# Patient Record
Sex: MALE | Race: BLACK OR AFRICAN AMERICAN | URBAN - METROPOLITAN AREA
[De-identification: names, ages, dates, MRNs, and addresses within clinical notes are randomized per-mention and may not be internally consistent; named-entity substitution may affect disease eponyms.]

---

## 2022-03-18 PROBLEM — I63.81 LEFT THALAMIC INFARCTION (HCC): Status: ACTIVE | Noted: 2022-01-25

## 2022-03-19 PROBLEM — F17.200 SMOKING: Status: ACTIVE | Noted: 2022-01-25

## 2022-03-19 PROBLEM — E78.2 MIXED HYPERLIPIDEMIA: Status: ACTIVE | Noted: 2022-01-25

## 2023-08-16 ENCOUNTER — HOSPITAL ENCOUNTER (OUTPATIENT)
Dept: PHYSICAL THERAPY | Age: 63
Setting detail: RECURRING SERIES
Discharge: HOME | End: 2023-08-19
Payer: COMMERCIAL

## 2023-08-16 PROCEDURE — 97110 THERAPEUTIC EXERCISES: CPT

## 2023-08-16 PROCEDURE — 97165 OT EVAL LOW COMPLEX 30 MIN: CPT

## 2023-08-16 PROCEDURE — 97140 MANUAL THERAPY 1/> REGIONS: CPT

## 2023-08-16 PROCEDURE — 97162 PT EVAL MOD COMPLEX 30 MIN: CPT

## 2023-08-16 ASSESSMENT — PAIN SCALES - GENERAL
PAINLEVEL_OUTOF10: 0
PAINLEVEL_OUTOF10: 0

## 2023-08-16 NOTE — THERAPY EVALUATION
Sonal Piedad  : 1960  Primary: [employer]  Secondary: [address] @ [address] [facility],[address] [place] [address]  [address] [place] [facility] [address]  Phone: [phone]  Fax: [phone] Plan Frequency: 2x per week    Plan of Care/Certification Expiration Date: 23      PT Visit Info:  Plan Frequency: 2x per week  Plan of Care/Certification Expiration Date: 23  Total # of Visits to Date: 1  Progress Note Counter: 1      Visit Count: 1                OUTPATIENT PHYSICAL THERAPY:             OP NOTE TYPE: Initial Assessment 2023               Episode (PT - CVA) Appt Desk         Treatment Diagnoses:    Generalized Muscle Weakness (M62.81)  Difficulty in walking, Not elsewhere classified (R26.2)  Other abnormalities of gait and mobility (R26.89)  Hemiplegia and hemiparesis following cerebral infarction affecting right dominant side (I69.351)  Medical/Referring Diagnosis:   Weakness [R53.1]  Referring Physician: Chuck Correia, APRN - NP  MD Orders: PT Eval and Treat   Return MD Appt: TBD  Date of Onset:      Allergies: Patient has no allergy information on record. Restrictions/Precautions:        Medications Last Reviewed: 2023     SUBJECTIVE   History of Injury/Illness (Reason for Referral):  Mr. Camille Palencia presents to outpatient PT s/p CVA affecting his R side. He says he woke up on 23 and noticed staggering during gait and slurred speech. He said he was going into work when he was encouraged to go to the doctor. He says the stroke affected his R side primarily, but he also notices some LUE deficits with his shoulder and hand specifically. He says he has a hard time grasping and holding onto something with his L hand without dropping it. He says that his UE deficits are greater than his LE deficits. He states that this is his 2nd stroke and that he was able to recover from the first one in 3-4 weeks.  He denies any

## 2023-08-16 NOTE — PROGRESS NOTES
Millicent Ramírez  : 1960  Primary: [employer]  Secondary: [address] @ [place] [address] [place] [address]  Phone: [phone]  Fax: [phone]    >OT Visit Info:   Plan Frequency: 1-2 times/week  Certification Period Expiration Date: 23     Visit Count: Visit count could not be calculated. Make sure you are using a visit which is associated with an episode. OUTPATIENT OCCUPATIONAL THERAPY: Treatment Note 2023  Episode: (OT - CVA)   Appt Desk      Treatment Diagnosis:  Pain in Left Shoulder (M25.512)  Pain in Left Wrist (M25.532)  Other lack of cordination (R27.8)  Hemiplegia and hemiparesis following cerebral infarction affecting right dominant side (M95.141)  Medical/Referring Diagnosis:  Weakness [R53.1]  Referring Physician:  TATIANA Lopez NP, MD Orders:  OT Eval and Treat   Date of Onset:  Onset Date: 23     Allergies:  Patient has no allergy information on record. Restrictions/Precautions:    No data recordedNo data recorded  Medications Last Reviewed:  2023     Interventions Planned: (Treatment may consist of any combination of the following)  Current Treatment Recommendations: Strengthening; ROM; Neuromuscular re-education; Pain management; Safety education & training; Equipment evaluation, education, & procurement; Modalities; Positioning; Dry needling; Self-Care / ADL; Manual Therapy:  STM; Manual Therapy:  Karan Warner; Coordination training; Other (comment) (orthotic management and training)     >Subjective Comments:  Patient complains of pain in his L shoulder/hand. Reports a history of R rotator cuff problems with history of surgery. >Initial:     0/10 >Post Session:     0/10  Medications Last Reviewed:  2023  Updated Objective Findings:  See evaluation note from today  Treatment   Manual Therapy: (8 minutes):  Manipulation and joint stabilization

## 2023-08-16 NOTE — THERAPY EVALUATION
Brad Khan  : 1960  Primary: [employer]  Secondary: [address] @ [address]  [facility]  [place] [address]  Phone: [phone]  Fax: [phone]    OT Visit Info:  Plan Frequency: 1-2 times/week  Certification Period Expiration Date: 23     Visit Count: Visit count could not be calculated. Make sure you are using a visit which is associated with an episode. OUTPATIENT OCCUPATIONAL THERAPY:OP NOTE TYPE: Initial Assessment 2023  Episode: (OT - CVA)   Appt Desk        Treatment Diagnosis:  Pain in Left Shoulder (M25.512)  Pain in Left Wrist (M25.532)  Other lack of cordination (R27.8)  Hemiplegia and hemiparesis following cerebral infarction affecting right dominant side (Y70.219)  Medical/Referring Diagnosis:  Weakness [R53.1]  Referring Physician:  TATIANA Wang NP, MD Orders:  OT Eval and Treat   Return MD Appt:    Date of Onset:  Onset Date: 23     Allergies:  Patient has no allergy information on record. Restrictions/Precautions:    No data recordedNo data recorded  Medications Last Reviewed:  2023     SUBJECTIVE   History of Injury/Illness (Reason for Referral):  Patient sustained a L pontine infarct on the . This was his 2nd stroke (History of prior left thalamic infarct (2021)- 3 weeks to recover his last one - about a year ago. Woke up that morning to go to work and thought he was fine, noticed he was staggering, going to get dressed, speech was slurred. Affected R side but L hand to forearm hurts and can hold something in L hand (tries to) and he drops it. L shoulder and hand pain at the base of his thumb. He states he is taking blood pressure medicine, blood thinners and cholesterol medicine. 81 mg baby aspirin. Patient denies any visual issues. States he has to think about moving now.   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

## 2023-08-16 NOTE — PROGRESS NOTES
GAIT TRAINING: (0 minutes): Gait training to improve and/or restore physical functioning as related to mobility, strength, balance and coordination. Ambulated with minimal visual, verbal, and tactile cueing related to stance phase, stride length, push off and heel strike. THERAPEUTIC EXERCISE: (16 minutes): Exercises per grid below to improve mobility, strength, and balance. Required minimal visual, verbal and tactile cues to promote proper body alignment, posture and body mechanics. Progressed resistance, range and repetitions as indicated. THERAPEUTIC ACTIVITY: (0 minutes): Therapeutic activities per grid below to improve mobility, strength and balance. Required minimal visual, verbal and tactile cues to promote static and dynamic balance in sitting/standing, as well as coordination of bilateral extremities. NEUROMUSCULAR RE-EDUCATION: (0 minutes): Exercise/activities per grid below to improve balance, coordination, kinesthetic sense, posture and proprioception. Required minimal visual, verbal and tactile cues to promote static and dynamic balance in sitting/standing, as well as coordination of bilateral extremities. MANUAL THERAPY: (0 minutes): Joint mobilization, soft tissue mobilization and manipulation was utilized and necessary because of the patient's restricted joint motion, painful spasm, loss of articular motion and restricted motion of soft tissue.      MODALITIES: (0 minutes): Vasopneumatic compression at 34 degrees      Date: 8/16/23 Date:  Date:    Activity/Exercise      Patient assessment & education HEP review     Bridges   1 x 10     Clam shells   1 x 10 each side  Red TB at knees     Sit to stands   1 x 10 w/o UE support                               HEP   - Supine Bridge  - 1 x daily - 7 x weekly - 3 sets - 15 reps  - Clam Shells  - 1 x daily - 3 x weekly - 3 sets - 15 reps  - Sit to Stand with Arms Crossed  - 2 x daily - 7 x weekly - 2 sets - 10 reps    Treatment/Session

## 2023-08-18 NOTE — THERAPY EVALUATION
(Patient reports his right upper central incisor is loose and getting pulled soon with plans to have a partial.)  Oral Hygiene: Moist, Clean  Lingual: No impairment  Velum: No Impairment  Mandible: No impairment   Additional Observations: Slight asymmetry with cheek puff (less air on the left side); Slight decreased ROM with alternating protrusion and retraction    Assessment tool used: [place] Dysarthria Assessment Tool  Phonation:   Loudness: Mildly reduced volume average: 70 dB  Quality: Clear vocal quality; no vocal strain perceived  Maximum Phonation Time: 17.24 seconds Within functional limits  S/Z ratio: 1.10 (s:17.74/z:15.99) Within functional limits  Pitch glide: Able to vary  Resonance: No hyper- or hypo- nasality perceived  Prosody: Able to vary prosody  Articulation: Alternating Motion Rates (AMR) performed and results as follows: /p/ 6.8 per second, /t/ 5.8 per second, /k/ 5.8 per second. AMR norm is 5-7 productions per second; therefore patient's AMR rate of /p/, /t/, and /k/ is within normal limits. Sequential Motion Rates (SMR) performed and results are as follows: /p, t, k/ 2.8 per second. SMR norm is 3-5 productions per second; therefore, SMR rate is below normal limits. Additional Observations: Also presents with occasional silences/pauses, variable rate of speech, articulatory precision slightly decreases with increasing syllables. ASSESSMENT    Initial Assessment:    Patient presents with mild dysarthria, characterized by variable rate of speech, blended word boundaries, mildly reduced volume, imprecision with articulation, occasional inappropriate silences, and visible/audible searching. Patient reports his speech is much slower than it previously was at baseline. He feels it is effortful to come up with the words he wants to say. He states speech becomes less clear as the day goes on and as he becomes fatigued. Endorses mild word finding difficulties.  Reports no change in swallow

## 2023-08-21 ENCOUNTER — HOSPITAL ENCOUNTER (OUTPATIENT)
Dept: PHYSICAL THERAPY | Age: 63
Setting detail: RECURRING SERIES
Discharge: HOME | End: 2023-08-24
Payer: COMMERCIAL

## 2023-08-21 PROCEDURE — 92523 SPEECH SOUND LANG COMPREHEN: CPT

## 2023-08-21 PROCEDURE — 97110 THERAPEUTIC EXERCISES: CPT

## 2023-08-21 ASSESSMENT — PAIN SCALES - GENERAL: PAINLEVEL_OUTOF10: 0

## 2023-08-21 NOTE — PROGRESS NOTES
Uli Camacho  : 1960  Primary: [employer]  Secondary: [address] @ [place] [address] [place] [facility] [address]  Phone: [phone]  Fax: [phone]    >OT Visit Info:   Plan Frequency: 1-2 times/week  Certification Period Expiration Date: 23     Visit Count: Visit count could not be calculated. Make sure you are using a visit which is associated with an episode. OUTPATIENT OCCUPATIONAL THERAPY: Treatment Note 2023  Episode: (OT - CVA)   Appt Desk      Treatment Diagnosis:  Pain in Left Shoulder (M25.512)  Pain in Left Wrist (M25.532)  Other lack of cordination (R27.8)  Hemiplegia and hemiparesis following cerebral infarction affecting right dominant side (A54.577)  Medical/Referring Diagnosis:  Weakness [R53.1]  Referring Physician:  TATIANA Sal NP, MD Orders:  OT Eval and Treat   Date of Onset:  Onset Date: 23     Allergies:  Patient has no allergy information on record. Restrictions/Precautions:    No data recordedNo data recorded  Medications Last Reviewed:  2023     Interventions Planned: (Treatment may consist of any combination of the following)  Current Treatment Recommendations: Strengthening; ROM; Neuromuscular re-education; Pain management; Safety education & training; Equipment evaluation, education, & procurement; Modalities; Positioning; Dry needling; Self-Care / ADL; Manual Therapy:  STM; Manual Therapy:  Anne George; Coordination training; Other (comment) (orthotic management and training)     >Subjective Comments:  Patient states his thumb is feeling better. >Initial:     0/10 >Post Session:     0/10  Medications Last Reviewed:  2023  Updated Objective Findings:  None Today  Treatment   THERAPEUTIC EXERCISE: (38 minutes):    Exercises per grid below to improve mobility, strength, balance, and coordination.   Required minimal visual, verbal, manual,

## 2023-08-24 NOTE — PROGRESS NOTES
Blair Singletary  : 1960  Primary: [employer]  Secondary: Tomi Roberts [facility] @ [address] [place] [address]  Phone: [phone]  Fax: [phone] Plan Frequency: 1-2 times/week    Certification Period Expiration Date: 23      SLP VISIT INFO  Effective Dates:    2023 TO 2023   Frequency/Duration  1-2 time(s) a week for 90 days  SLP Visit Info:  No Show: 0  Canceled Appointment: 0  Total # of Visits to Date: 1      OUTPATIENT SPEECH PATHOLOGY NOTE:Daily Note 2023  Appt Desk   Episode      Treatment Diagnosis: R47.1 Dysarthria and Anarthria  Medical/Referring Diagnosis:  Weakness [R53.1]  Referring Physician:  VASU Almaraz MD Orders:  Eval and Treat   Allergies:  Patient has no allergy information on record. Medications Last Reviewed:  2023  Subjective Comments:  Patient states his upper central incisor fell out today after being quite loose for a while and he had plans of getting it pulled. Will f/u next session to see if this has an impact on po intake. Pain:  Patient does not c/o pain. Interventions Planned: (Treatment may consist of any combination of the following)  Oral motor exercises, Motor speech based treatment, Training in compensatory strategies, and Education  GOALS: (Goals have been discussed and agreed upon with patient.)  Short-Term Functional Goals: Time Frame: 8 weeks  Patient will perform oral motor exercises x5 each with >90% accuracy to improve strength and coordination of oral articulators. Patient will use pacing and pausing techniques at phrase/sentence/paragraph level with min cues 80% of the time. Patient will articulate consonants at phrase/sentence/paragraph level with 90% accuracy with min cues. Patient will independently use compensatory strategies at conversational level to improve speech intelligibility to >90% accuracy.   Patient will perform

## 2023-08-25 ENCOUNTER — HOSPITAL ENCOUNTER (OUTPATIENT)
Dept: PHYSICAL THERAPY | Age: 63
Setting detail: RECURRING SERIES
Discharge: HOME | End: 2023-08-28
Payer: COMMERCIAL

## 2023-08-25 PROCEDURE — 97110 THERAPEUTIC EXERCISES: CPT

## 2023-08-25 PROCEDURE — 97530 THERAPEUTIC ACTIVITIES: CPT

## 2023-08-25 PROCEDURE — 92507 TX SP LANG VOICE COMM INDIV: CPT

## 2023-08-25 PROCEDURE — 97018 PARAFFIN BATH THERAPY: CPT

## 2023-08-25 ASSESSMENT — PAIN SCALES - GENERAL
PAINLEVEL_OUTOF10: 0
PAINLEVEL_OUTOF10: 0

## 2023-08-25 NOTE — PROGRESS NOTES
Tristan Estrada  : 1960  Primary: [employer]  Secondary: [address] @ [address] [facility]  [address] [place] [address]  Phone: [phone]  Fax: [phone] Plan Frequency: 2x per week    Plan of Care/Certification Expiration Date: 23      >PT Visit Info:  Plan Frequency: 2x per week  Plan of Care/Certification Expiration Date: 23  Total # of Visits to Date: 2  Progress Note Counter: 2  No Show: 1      Visit Count: 2    OUTPATIENT PHYSICAL THERAPY:OP NOTE TYPE: Treatment Note 2023       Episode  Appt Desk             Treatment Diagnoses:    Generalized Muscle Weakness (M62.81)  Difficulty in walking, Not elsewhere classified (R26.2)  Other abnormalities of gait and mobility (R26.89)  Hemiplegia and hemiparesis following cerebral infarction affecting right dominant side (I69.351)  Medical/Referring Diagnosis:   Weakness [R53.1]  Referring Physician: TATIANA Oreilly NP, MD Orders: PT Eval and Treat  Date of Onset: No data recorded   Allergies: Patient has no allergy information on record. Restrictions/Precautions:  No data recordedNo data recorded     Interventions Planned (Treatment may consist of any combination of the following):    Current Treatment Recommendations: Strengthening; ROM; Balance training; Functional mobility training; Transfer training; ADL/Self-care training; Endurance training; Gait training; Stair training; Neuromuscular re-education; Manual; Pain management; Home exercise program; Return to work related activity; Safety education & training; Patient/Caregiver education & training; Equipment evaluation, education, & procurement; Modalities; Positioning; Dry needling; Therapeutic activities       >Subjective Comments: Patient reports he's doing well this afternoon. He denies reports of pain.      >Initial:     0/10     >Post Session:       0/10     Medications Last

## 2023-08-25 NOTE — PROGRESS NOTES
[facility]idelines  [facility]    Future Appointments   Date Time Provider [address]   8/28/2023  1:00 PM [facility]   8/28/2023  1:45 PM Lito Abt, OT [facility]   8/28/2023  2:30 PM Ac Holloway, SLP [facility]   8/30/2023  1:00 PM Ac Holloway, SLP [facility]   8/30/2023  1:45 PM Arlyne Pipes, PT [facility]   8/30/2023  2:30 PM Lito Abt, OT [facility]   9/6/2023  1:00 PM Lito Abt, OT [facility]   9/6/2023  1:45 PM Ac Holloway, SLP [facility]   9/6/2023  2:30 PM Mya Quevedo, PTA [facility]   9/8/2023  1:00 PM SFD OCCUPATIONAL THERAPIST [facility]   9/8/2023  1:45 PM Mya Quevedo, PTA [facility]   9/8/2023  2:30 PM Ac Holloway, SLP [facility]   9/11/2023  1:00 PM Lito Flores, OT [facility]   9/11/2023  1:45 PM Ac Holloway, SLP [facility]   9/11/2023  2:30 PM Mya Quevedo, PTA [facility]   9/13/2023  1:00 PM Mya Quevedo, PTA [facility]   9/13/2023  1:45 PM Ac Holloway, SLP [facility]   9/13/2023  2:30 PM Lito Abt, OT [facility]

## 2023-08-28 ENCOUNTER — HOSPITAL ENCOUNTER (OUTPATIENT)
Dept: PHYSICAL THERAPY | Age: 63
Setting detail: RECURRING SERIES
Discharge: HOME | End: 2023-08-31
Payer: COMMERCIAL

## 2023-08-28 PROCEDURE — 97110 THERAPEUTIC EXERCISES: CPT

## 2023-08-28 PROCEDURE — 97530 THERAPEUTIC ACTIVITIES: CPT

## 2023-08-28 PROCEDURE — 92507 TX SP LANG VOICE COMM INDIV: CPT

## 2023-08-28 ASSESSMENT — PAIN SCALES - GENERAL: PAINLEVEL_OUTOF10: 0

## 2023-08-28 NOTE — PROGRESS NOTES
strength 7 minutes, L7  Reciprocal pattern & strength    Side steps against resistance    3 x 2 laps along counter  Green TB at ankles     Leg press machine     2 x 10 B 55#  1 x 25 B 70#    Lateral step ups     2 x 10 each side  6\" step    Standing heel raises    2 x 10 single-leg B 2 x 20 B  Toes elevated 2\"    Standing toe raises     2 x 20 B  Leaning against wall    Incline board stretch     2 x 60 sec  2nd rung             HEP   - Supine Bridge  - 1 x daily - 7 x weekly - 3 sets - 15 reps  - Clam Shells  - 1 x daily - 3 x weekly - 3 sets - 15 reps  - Sit to Stand with Arms Crossed  - 2 x daily - 7 x weekly - 2 sets - 10 reps    Treatment/Session Summary:    >Treatment Assessment: Patient tolerated therapy well this afternoon. He did well with new strengthening exercises today without reports of pain. He seems to be doing well without using his walker during gait and denies any falls. Communication/Consultation: PT encouraged patient to performing HEP consistently. Equipment provided: Red and green theraband  Recommendations/Intent for next treatment session: Next visit will focus on strengthening, gait, and balance.     >Total Treatment Billable Duration: 38 minutes  Time In: 1310  Time Out: 1325 [facility] Session [facility]  PT Visit Info  [facility] Portal  MD Guidelines  Scanned Media  Benefits  MyChart    Future Appointments   Date Time Provider [address]   [facility]28[facility]2023  [facility]   8/30/2023  1:00 PM SMITA Martines [facility]   8/30/2023  1:45 PM Aurea Cortes PT [facility]   8/30/2023  2:30 PM Celina Rodriguez OT [facility]   9/6/2023  1:00 PM Celina Rodriguez OT [facility]   9/6/2023  1:45 PM SMITA Martines [facility]   9/6/2023  2:30 PM ALFRED Mills [facility]   9/8/2023  1:00 PM ANG OCCUPATIONAL THERAPIST NELIDA [facility]   9/8/2023  1:45 PM Derek Romano PTA [facility]   9/8/2023  2:30 PM Marylene Hidden

## 2023-08-28 NOTE — PROGRESS NOTES
Brad Khan  : 1960  Primary: [employer]  Secondary: [address] @ [place] [address]  Phone: [phone]  Fax: [phone]    >OT Visit Info:   Plan Frequency: 1-2 times/week  Certification Period Expiration Date: 23  Total # of Visits to Date: 4  Progress Note Counter: 4     Visit Count: Visit count could not be calculated. Make sure you are using a visit which is associated with an episode. OUTPATIENT OCCUPATIONAL THERAPY: Treatment Note 2023  Episode: (OT - CVA)   Appt Desk      Treatment Diagnosis:  Pain in Left Shoulder (M25.512)  Pain in Left Wrist (M25.532)  Other lack of cordination (R27.8)  Hemiplegia and hemiparesis following cerebral infarction affecting right dominant side (V17.575)  Medical/Referring Diagnosis:  Weakness [R53.1]  Referring Physician:  TATIANA Wang NP, MD Orders:  OT Eval and Treat   Date of Onset:  Onset Date: 23     Allergies:  Patient has no allergy information on record. Restrictions/Precautions:    No data recordedNo data recorded  Medications Last Reviewed:  2023     Interventions Planned: (Treatment may consist of any combination of the following)  Current Treatment Recommendations: Strengthening; ROM; Neuromuscular re-education; Pain management; Safety education & training; Equipment evaluation, education, & procurement; Modalities; Positioning; Dry needling; Self-Care / ADL; Manual Therapy:  STM; Manual Therapy:  Odean Achilles; Coordination training; Other (comment) (orthotic management and training)     >Subjective Comments:  Patient states he's been able to rest at home. >Initial:      (just a little (in his L thumb))/10 >Post Session:      (same)/10  Medications Last Reviewed:  2023  Updated Objective Findings:  None Today  Treatment   THERAPEUTIC ACTIVITY: (15 minutes):     Therapeutic activities per

## 2023-08-28 NOTE — PROGRESS NOTES
Bella Leigh  : 1960  Primary: [employer]  Secondary: Jason Roberts [facility] [address] @ [address] [place], [place] [address] [facility] [address]  Phone: [phone]  Fax: [phone] Plan Frequency: 1-2 times/week    Certification Period Expiration Date: 23      SLP VISIT INFO  Effective Dates:    2023 TO 2023   Frequency/Duration  1-2 time(s) a week for 90 days  SLP Visit Info:  No Show: 0  Canceled Appointment: 0  Total # of Visits to Date: 2      OUTPATIENT SPEECH PATHOLOGY NOTE:Daily Note 2023  Appt Desk   Episode      Treatment Diagnosis: R47.1 Dysarthria and Anarthria  Medical/Referring Diagnosis:  Weakness [R53.1]  Referring Physician:  VASU Rabago MD Orders:  Eval and Treat   Allergies:  Patient has no allergy information on record. Medications Last Reviewed:  2023  Subjective Comments:  Patient states eating has been much easier since the tooth came out. Patient states he has appointment with PCP tomorrow to talk to them about his cholesterol medicine. Pain:  Patient does not c/o pain. Interventions Planned: (Treatment may consist of any combination of the following)  Oral motor exercises, Motor speech based treatment, Training in compensatory strategies, and Education  GOALS: (Goals have been discussed and agreed upon with patient.)  Short-Term Functional Goals: Time Frame: 8 weeks  Patient will perform oral motor exercises x5 each with >90% accuracy to improve strength and coordination of oral articulators. Patient will use pacing and pausing techniques at phrase/sentence/paragraph level with min cues 80% of the time. Patient will articulate consonants at phrase/sentence/paragraph level with 90% accuracy with min cues. Patient will independently use compensatory strategies at conversational level to improve speech intelligibility to >90% accuracy.   Patient will perform home exercise program based

## 2023-08-30 ENCOUNTER — HOSPITAL ENCOUNTER (OUTPATIENT)
Dept: PHYSICAL THERAPY | Age: 63
Setting detail: RECURRING SERIES
End: 2023-08-30
Payer: COMMERCIAL

## 2023-08-30 ENCOUNTER — APPOINTMENT (OUTPATIENT)
Dept: PHYSICAL THERAPY | Age: 63
End: 2023-08-30
Payer: COMMERCIAL

## 2023-08-30 NOTE — PROGRESS NOTES
Shakira Jorgensen  : 1960  Primary: [employer]  Secondary: Kimberley Parson Armando [facility] @ [address] [place] [address]  Phone: [phone]  Fax: [phone]    PT Visit Info: Total # of Visits to Date: 3  Progress Note Counter: 3  No Show: 1     OT Visit Info: Total # of Visits to Date: 4  Progress Note Counter: 4     SLP Visit Info:  No Show: 0  Canceled Appointment: 1 ( reason unknown)  Total # of Visits to Date: 2      OUTPATIENT THERAPY: 2023  Episode  Appt Desk        Shakira Jorgensen cancelled his appointment for today due to unknown reasons. Will plan to follow up next during next appointment.   Thank you,  SMITA Brown    Future Appointments   Date Time Provider [address]   2023  7:00 PM Arjun Real [facility]   2023 10:15 AM Miguel Casillas PT [facility]   2023 11:00 AM Beni Mills, OT [facility]   2023  1:00 PM Beni Mills OT [facility]   2023  1:45 PM SMITA BrownRPT D   2023  2:30 PM Boom Killian, PTA [facility]   2023  1:00 PM SFD OCCUPATIONAL THERAPIST [facility]   2023  1:45 PM Boom Killian, PTA [facility]   [facility]   2023  1:00 PM Beni Mills OT [facility]   2023  1:45 PM [facility]   2023  2:30 PM [facility]   2023  1:00 PM Boom Killian, PTA [facility]   2023  1:45 PM SMITA Brown [facility] SFD   2023  2:30 PM Beni Mills OT [facility]

## 2023-09-01 ENCOUNTER — HOSPITAL ENCOUNTER (OUTPATIENT)
Dept: PHYSICAL THERAPY | Age: 63
Setting detail: RECURRING SERIES
Discharge: HOME | End: 2023-09-04
Payer: COMMERCIAL

## 2023-09-01 PROCEDURE — 97530 THERAPEUTIC ACTIVITIES: CPT

## 2023-09-01 PROCEDURE — 97110 THERAPEUTIC EXERCISES: CPT

## 2023-09-01 ASSESSMENT — PAIN SCALES - GENERAL
PAINLEVEL_OUTOF10: 0
PAINLEVEL_OUTOF10: 0

## 2023-09-01 NOTE — PROGRESS NOTES
Gato Romero, [facility]   9/8/2023  2:30 PM SMITA Le [facility]   9/11/2023  1:00 PM Ian Dacosta, [facility]   9/11/2023  1:45 PM SMITA Le [facility]   9/11/2023  2:30 PM Phillip Bryant, [facility]   9/13/2023  1:00 PM Phillip Bryant [facility]   9/13/2023  1:45 PM SMITA Le [facility]   9/13/2023  2:30 PM Ian Dacosta, [facility]

## 2023-09-01 NOTE — PROGRESS NOTES
9/1/2023    Updated Objective Findings: N/A    Treatment     GAIT TRAINING: (0 minutes): Gait training to improve and/or restore physical functioning as related to mobility, strength, balance and coordination. Ambulated with minimal visual, verbal, and tactile cueing related to stance phase, stride length, push off and heel strike. THERAPEUTIC EXERCISE: (38 minutes): Exercises per grid below to improve mobility, strength, and balance. Required minimal visual, verbal and tactile cues to promote proper body alignment, posture and body mechanics. Progressed resistance, range and repetitions as indicated. THERAPEUTIC ACTIVITY: (0 minutes): Therapeutic activities per grid below to improve mobility, strength and balance. Required minimal visual, verbal and tactile cues to promote static and dynamic balance in sitting/standing, as well as coordination of bilateral extremities. NEUROMUSCULAR RE-EDUCATION: (0 minutes): Exercise/activities per grid below to improve balance, coordination, kinesthetic sense, posture and proprioception. Required minimal visual, verbal and tactile cues to promote static and dynamic balance in sitting/standing, as well as coordination of bilateral extremities. MANUAL THERAPY: (0 minutes): Joint mobilization, soft tissue mobilization and manipulation was utilized and necessary because of the patient's restricted joint motion, painful spasm, loss of articular motion and restricted motion of soft tissue.      MODALITIES: (0 minutes): Vasopneumatic compression at 34 degrees      Date: 8/25/23 Date: 8/28/23 Date: 9/1/23 Date:    Activity/Exercise       Patient assessment & education       Bridges   2 x 15 x 5 sec single-leg B  Other foot resting on bolster      Clam shells         Sit to stands   2 x 10 from low mat  L foot on 4\" step      NuStep   8 minutes, L3  Reciprocal pattern & strength 7 minutes, L7  Reciprocal pattern & strength 8 minutes, L8  Reciprocal pattern & strength

## 2023-09-06 ENCOUNTER — HOSPITAL ENCOUNTER (OUTPATIENT)
Dept: PHYSICAL THERAPY | Age: 63
Setting detail: RECURRING SERIES
Discharge: HOME | End: 2023-09-09
Payer: COMMERCIAL

## 2023-09-06 PROCEDURE — 97110 THERAPEUTIC EXERCISES: CPT

## 2023-09-06 PROCEDURE — 92507 TX SP LANG VOICE COMM INDIV: CPT

## 2023-09-06 ASSESSMENT — PAIN SCALES - GENERAL: PAINLEVEL_OUTOF10: 0

## 2023-09-06 NOTE — PROGRESS NOTES
Sue Graza  : 1960  Primary: [employer]  Secondary: [address] @ [address] [place] [address] [address]  Phone: [phone]  Fax: [phone] Plan Frequency: 2x per week    Plan of Care/Certification Expiration Date: 23      >PT Visit Info:  Plan Frequency: 2x per week  Plan of Care/Certification Expiration Date: 23  Total # of Visits to Date: 4  Progress Note Counter: 4  No Show: 1      Visit Count: 5    OUTPATIENT PHYSICAL THERAPY:OP NOTE TYPE: Treatment Note 2023       Episode  Appt Desk             Treatment Diagnoses:    Generalized Muscle Weakness (M62.81)  Difficulty in walking, Not elsewhere classified (R26.2)  Other abnormalities of gait and mobility (R26.89)  Hemiplegia and hemiparesis following cerebral infarction affecting right dominant side (I69.351)  Medical/Referring Diagnosis:   Weakness [R53.1]  Referring Physician: TATIANA Valdez NP, MD Orders: PT Eval and Treat  Date of Onset: No data recorded   Allergies: Patient has no allergy information on record. Restrictions/Precautions:  No data recordedNo data recorded     Interventions Planned (Treatment may consist of any combination of the following):    Current Treatment Recommendations: Strengthening; ROM; Balance training; Functional mobility training; Transfer training; ADL/Self-care training; Endurance training; Gait training; Stair training; Neuromuscular re-education; Manual; Pain management; Home exercise program; Return to work related activity; Safety education & training; Patient/Caregiver education & training; Equipment evaluation, education, & procurement; Modalities; Positioning; Dry needling; Therapeutic activities       >Subjective Comments:  Says he has some numbness on his right side. Doesn't use a walker anymore at home.  Referees basketball    >Initial:     0 10     >Post Session:       0 10

## 2023-09-06 NOTE — PROGRESS NOTES
reps Index finger abduction adduction against rubber band resistance 1-2 sets 10-15 reps    Thumb perdomo abduction adduction A: 1-2 sets 10-15 reps AROM. B: against rubber band resistance 1-2 sets 10-15 reps Index finger abduction adduction against rubber band resistance 2-3 sets 10-15 reps    Thumb perdomo abduction adduction A: 2-3 sets 10-15 reps AROM. B: against rubber band resistance 2-3 sets 10-15 reps Index finger abduction adduction against rubber band resistance 2-3 sets 10-15 reps    Thumb perdomo abduction adduction A: 2-3 sets 10-15 reps AROM. Wall slides  Hands in pillow case and slide up/down door 2-3 sets 5-10 reps      Thumb stretches  Thumb web space stretch A: by therapist 1-2 sets held 30-60 seconds. B: by patient 1-2 sets held 30-60 seconds. Thumb web space stretch using a clip 1-2 sets held 30-60 seconds. Digit extension    R hand against orange theraputty resistance 1-2 sets 10-15 reps R hand against orange theraputty resistance 1-2 sets 10-15 reps     Access Code: [email or web address]  URL: [email or web address]  Date: 08/21/2023  Prepared by: Malorie Shines    Exercises  - Standing Shoulder Posterior Capsule Stretch  - 3 x daily - 7 x weekly - 1 sets - 1 reps - 1-2 minutes hold  - Seated Shoulder External Rotation AAROM with Dowel  - 1 x daily - 7 x weekly - 1 sets - 10 reps - 15-30 seconds hold  - Shoulder External Rotation and Scapular Retraction with Resistance  - 1 x daily - 7 x weekly - 1 sets - 10-20 reps  - Scapular Retraction with Resistance  - 1 x daily - 7 x weekly - 1 sets - 10-20 reps    Access Code: [email or web address]  URL: [email or web address]  Date: 08/28/2023  Prepared by: Hildegarde Shines    Exercises  - Sidelying Shoulder External Rotation AROM  - 2 x daily - 7 x weekly - 1-2 sets - 10-20 reps  - Sidelying Shoulder Abduction Palm Forward  - 2 x daily - 7 x weekly - 1-2 sets - 20-25 reps  Treatment/Session Summary:    >Treatment Assessment:    Patient continues to make

## 2023-09-07 NOTE — PROGRESS NOTES
Kimi Melendez  : 1960  Primary: [employer]  Secondary: Jameson Roberts [facility] [address] @ [address] [place], [place] [address]ice [facility] [address]  Phone: [phone]  Fax: [phone] Plan Frequency: 1-2 times/week    Certification Period Expiration Date: 23      SLP VISIT INFO  Effective Dates:    2023 TO 2023   Frequency/Duration  1-2 time(s) a week for 90 days  SLP Visit Info:  No Show: 0  Canceled Appointment: 1 ( reason unknown)  Total # of Visits to Date: 4     OUTPATIENT SPEECH PATHOLOGY NOTE:Daily Note 2023  Appt Desk   Episode      Treatment Diagnosis: R47.1 Dysarthria and Anarthria  Medical/Referring Diagnosis:  Weakness [R53.1]  Referring Physician:  VASU Mathias MD Orders:  Eval and Treat   Allergies:  Patient has no allergy information on record. Medications Last Reviewed:  2023  Subjective Comments:  Patient reports things are going well this week. Pain:  Patient does not c/o pain. Interventions Planned: (Treatment may consist of any combination of the following)  Oral motor exercises, Motor speech based treatment, Training in compensatory strategies, and Education  GOALS: (Goals have been discussed and agreed upon with patient.)  Short-Term Functional Goals: Time Frame: 8 weeks  Patient will perform oral motor exercises x5 each with >90% accuracy to improve strength and coordination of oral articulators. Patient will use pacing and pausing techniques at phrase/sentence/paragraph level with min cues 80% of the time. Patient will articulate consonants at phrase/sentence/paragraph level with 90% accuracy with min cues. Patient will independently use compensatory strategies at conversational level to improve speech intelligibility to >90% accuracy. Patient will perform home exercise program based on incorporating compensatory strategies into communication with 90% efficiency.    Discharge Goals:

## 2023-09-08 ENCOUNTER — HOSPITAL ENCOUNTER (OUTPATIENT)
Dept: PHYSICAL THERAPY | Age: 63
Setting detail: RECURRING SERIES
Discharge: HOME | End: 2023-09-11
Payer: COMMERCIAL

## 2023-09-08 PROCEDURE — 97530 THERAPEUTIC ACTIVITIES: CPT

## 2023-09-08 PROCEDURE — 92507 TX SP LANG VOICE COMM INDIV: CPT

## 2023-09-08 PROCEDURE — 97110 THERAPEUTIC EXERCISES: CPT

## 2023-09-08 ASSESSMENT — PAIN SCALES - GENERAL
PAINLEVEL_OUTOF10: 0
PAINLEVEL_OUTOF10: 0

## 2023-09-08 NOTE — PROGRESS NOTES
Standing Shoulder Posterior Capsule Stretch  - 3 x daily - 7 x weekly - 1 sets - 1 reps - 1-2 minutes hold  - Seated Shoulder External Rotation AAROM with Dowel  - 1 x daily - 7 x weekly - 1 sets - 10 reps - 15-30 seconds hold  - Shoulder External Rotation and Scapular Retraction with Resistance  - 1 x daily - 7 x weekly - 1 sets - 10-20 reps  - Scapular Retraction with Resistance  - 1 x daily - 7 x weekly - 1 sets - 10-20 reps    Access Code: [email or web address]  URL: [email or web address]  Date: 08/28/2023  Prepared by: Angus Pin    Exercises  - Sidelying Shoulder External Rotation AROM  - 2 x daily - 7 x weekly - 1-2 sets - 10-20 reps  - Sidelying Shoulder Abduction Palm Forward  - 2 x daily - 7 x weekly - 1-2 sets - 20-25 reps  Treatment/Session Summary:    >Treatment Assessment:    Patient has made great progress toward goals. States his LUE is no longer an issue/hurting him. Patient is improving with fine and gross motor coordination in RUE. Continue OT per plan of care. Communication/Consultation:  None today  Equipment provided today:  None today  Recommendations/Intent for next treatment session: Next visit will focus on advancement to more challenging activities.     >Total Treatment Billable Duration: 40  minutes   OT Individual Minutes  Time In: 1300  Time Out: 5409  Minutes: [address], OT    Post Session Pain  Charge Capture   POC Link  Treatment Note Link  MD Guidelines  MyChart    Future Appointments   Date Time Provider [address]   9/11/2023  1:00 PM Fouzia Gonsales OT [facility]   9/11/2023  1:45 PM SMITA Fish [facility]   9/11/2023  2:30 PM Stacy Colón [facility]   9/13/2023  1:00 PM Stacy Colón [facility]   9/13/2023  1:45 PM SMITA Fish [facility]   9/13/2023  2:30 PM Fouzia Gonsales OT [facility]

## 2023-09-08 NOTE — PROGRESS NOTES
Tristan Estrada  : 1960  Primary: [employer]  Secondary: [address] @ [address] [facility] [address] [facility] [address]  Phone: [phone]  Fax: [phone] Plan Frequency: 2x per week    Plan of Care/Certification Expiration Date: 23      >PT Visit Info:  Plan Frequency: 2x per week  Plan of Care/Certification Expiration Date: 23  Total # of Visits to Date: 6  Progress Note Counter: 6  No Show: 1      Visit Count: 6    OUTPATIENT PHYSICAL THERAPY:OP NOTE TYPE: Treatment Note 2023       Episode  Appt Desk             Treatment Diagnoses:    Generalized Muscle Weakness (M62.81)  Difficulty in walking, Not elsewhere classified (R26.2)  Other abnormalities of gait and mobility (R26.89)  Hemiplegia and hemiparesis following cerebral infarction affecting right dominant side (I69.351)  Medical/Referring Diagnosis:   Weakness [R53.1]  Referring Physician: TATIANA Oreilly NP, MD Orders: PT Eval and Treat  Date of Onset: No data recorded   Allergies: Patient has no allergy information on record. Restrictions/Precautions:  No data recordedNo data recorded     Interventions Planned (Treatment may consist of any combination of the following):    Current Treatment Recommendations: Strengthening; ROM; Balance training; Functional mobility training; Transfer training; ADL/Self-care training; Endurance training; Gait training; Stair training; Neuromuscular re-education; Manual; Pain management; Home exercise program; Return to work related activity; Safety education & training; Patient/Caregiver education & training; Equipment evaluation, education, & procurement; Modalities; Positioning; Dry needling; Therapeutic activities       >Subjective Comments: No pain today or soreness, but is tired today.     >Initial:     0/10     >Post Session:       0/10     Medications Last Reviewed: 2023    Updated

## 2023-09-11 ENCOUNTER — HOSPITAL ENCOUNTER (OUTPATIENT)
Dept: PHYSICAL THERAPY | Age: 63
Setting detail: RECURRING SERIES
End: 2023-09-11
Payer: COMMERCIAL

## 2023-09-11 NOTE — PROGRESS NOTES
Gillian Chavez  : 1960  Primary: [employer]  Secondary: Sudha Moburke [address] [place] [address] @ [address] [facility] [address] 85[address] [facility] [address]  Phone: [phone]  Fax: [phone] Plan Frequency: 2x per week    Plan of Care/Certification Expiration Date: 23      PT Visit Info: Total # of Visits to Date: 6  Progress Note Counter: 6  No Show: 1  Canceled Appointment: 1         OUTPATIENT PHYSICAL THERAPY 2023     Appt Desk   Episode   MyChart      Mr. Patrizia Medrano was a same-day cancellation for today's appointment.      Cancellation Number: Hamilton Kerns PTA    Future Appointments   Date Time Provider [address]   2023  1:00 PM Lulú Ulloa PTA [facility]   2023  1:45 PM SMITA Mooney [facility]   2023  2:30 PM Home Dominguez OT [facility]

## 2023-09-11 NOTE — PROGRESS NOTES
Sue Garza  : 1960  Primary: [employer]  Secondary: [facility] [address] @ [address] [facility] [address] [facility] [address]  Phone: [phone]  Fax: [phone] Plan Frequency: 2x per week    Plan of Care/Certification Expiration Date: 23      OT Visit Info: Total # of Visits to Date: 7  Progress Note Counter: [address] THERAPY 2023     Appt Desk   Episode   MyChart      Mr. Robyn Wilkins was a same-day cancellation for today's appointment due to illness. Plan to follow up upon next scheduled visit.       Cancellation Number:   1    Kee Karimi OT    Future Appointments   Date Time Provider [address]   [facility]45 [facility]   2023  2:30 PM Bianka Alves PTA [facility]   2023  1:00 PM Bianka Alves PTA [facility]   2023  1:45 PM SMITA Martines [facility]   2023  2:30 PM Kee Karimi OT [facility]

## 2023-09-11 NOTE — PROGRESS NOTES
Carri Flaherty  : 1960  Primary: [employer]  Secondary: Yuriy Hodgkin [address] [place] [address] @ [address] [address]  Phone: [phone]  Fax: [phone]    SLP Visit Info:  No Show: 0  Canceled Appointment: 2 (-stomach issues)  Total # of Visits to Date: 4      OUTPATIENT THERAPY: 2023  Episode  Appt Desk        Carri Flaherty cancelled his appointment for today due to  stomach issues . Will plan to follow up next during next appointment.   Thank you,  SMITA Hoover    Future Appointments   Date Time Provider [facility] [address]   2023  1:00 PM Chelle Grady PTA [facility]   2023  1:45 PM SMITA Hoover [facility]   2023  2:30 PM Naomi Quiñonez OT [facility]

## 2023-09-12 NOTE — PROGRESS NOTES
Luz Peraza  : 1960  Primary: [employer]  Secondary: Miki Roberts [facility] [address] @ [address] [place] [address]  Phone: [phone]  Fax: [phone] Plan Frequency: 1-2 times/week    Certification Period Expiration Date: 23      SLP VISIT INFO  Effective Dates:    2023 TO 2023   Frequency/Duration  1-2 time(s) a week for 90 days  SLP Visit Info:  No Show: 0  Canceled Appointment: 2 (-stomach issues)  Total # of Visits to Date: 5     OUTPATIENT SPEECH PATHOLOGY NOTE:Daily Note 2023  Appt Desk   Episode      Treatment Diagnosis: R47.1 Dysarthria and Anarthria  Medical/Referring Diagnosis:  Weakness [R53.1]  Referring Physician:  VASU Silva MD Orders:  Eval and Treat   Allergies:  Patient has no allergy information on record. 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 He could be clearly understood during. Pain:  Patient does not c/o pain. Interventions Planned: (Treatment may consist of any combination of the following)  Oral motor exercises, Motor speech based treatment, Training in compensatory strategies, and Education  GOALS: (Goals have been discussed and agreed upon with patient.)  Short-Term Functional Goals: Time Frame: 8 weeks  Patient will perform oral motor exercises x5 each with >90% accuracy to improve strength and coordination of oral articulators. Patient will use pacing and pausing techniques at phrase/sentence/paragraph level with min cues 80% of the time. Patient will articulate consonants at phrase/sentence/paragraph level with 90% accuracy with min cues. Patient will independently use compensatory strategies at conversational level to improve speech intelligibility to >90% accuracy.   Patient will perform home exercise program based on incorporating compensatory

## 2023-09-13 ENCOUNTER — HOSPITAL ENCOUNTER (OUTPATIENT)
Dept: PHYSICAL THERAPY | Age: 63
Setting detail: RECURRING SERIES
Discharge: HOME | End: 2023-09-16
Payer: COMMERCIAL

## 2023-09-13 PROCEDURE — 97110 THERAPEUTIC EXERCISES: CPT

## 2023-09-13 PROCEDURE — 92507 TX SP LANG VOICE COMM INDIV: CPT

## 2023-09-13 PROCEDURE — 97530 THERAPEUTIC ACTIVITIES: CPT

## 2023-09-13 ASSESSMENT — PAIN SCALES - GENERAL
PAINLEVEL_OUTOF10: 0

## 2023-09-13 NOTE — PROGRESS NOTES
Jay Black  : 1960  Primary: [employer]  Secondary: [address] @ [address] [facility] [address] [place] [address]  Phone: [phone]  Fax: [phone] Plan Frequency: 2x per week    Plan of Care/Certification Expiration Date: 23      >PT Visit Info:  Plan Frequency: 2x per week  Plan of Care/Certification Expiration Date: 23  Total # of Visits to Date: 7  Progress Note Counter: 7  No Show: 1  Canceled Appointment: 1      Visit Count: 7    OUTPATIENT PHYSICAL THERAPY:OP NOTE TYPE: Treatment Note 2023       Episode  Appt Desk             Treatment Diagnoses:    Generalized Muscle Weakness (M62.81)  Difficulty in walking, Not elsewhere classified (R26.2)  Other abnormalities of gait and mobility (R26.89)  Hemiplegia and hemiparesis following cerebral infarction affecting right dominant side (I69.351)  Medical/Referring Diagnosis:   Weakness [R53.1]  Referring Physician: TATIANA Nieto NP, MD Orders: PT Eval and Treat  Date of Onset: No data recorded   Allergies: Patient has no allergy information on record. Restrictions/Precautions:  No data recordedNo data recorded     Interventions Planned (Treatment may consist of any combination of the following):    Current Treatment Recommendations: Strengthening; ROM; Balance training; Functional mobility training; Transfer training; ADL/Self-care training; Endurance training; Gait training; Stair training; Neuromuscular re-education; Manual; Pain management; Home exercise program; Return to work related activity; Safety education & training; Patient/Caregiver education & training; Equipment evaluation, education, & procurement; Modalities; Positioning; Dry needling; Therapeutic activities       >Subjective Comments: No 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 Pt says he has no soreness today.   >Initial:     0/10     >Post Session:

## 2023-09-19 NOTE — PROGRESS NOTES
Desiree Shah  : 1960  Primary: [employer]  Secondary: Delores Parents [address] @ [address] [place] [address]  Phone: [phone]  Fax: [phone]    SLP Visit Info:  No Show: 1 ()  Canceled Appointment: 2 (-stomach issues)  Total # of Visits to Date: 5      OUTPATIENT THERAPY: 2023  Episode  Appt Desk        Desiree Shah  left without being seen  his appointment for today due to  recommended by OT to get checked out by ER or urgent care d/t slurred speech onset this morning . Will call patient to follow up about scheduling appointment when patient is able. Thank you,  Alex Johnson, SLP    No future appointments.

## 2023-09-20 ENCOUNTER — APPOINTMENT (OUTPATIENT)
Dept: CT IMAGING | Age: 63
DRG: 066 | End: 2023-09-20
Payer: COMMERCIAL

## 2023-09-20 ENCOUNTER — APPOINTMENT (OUTPATIENT)
Dept: MRI IMAGING | Age: 63
DRG: 066 | End: 2023-09-20
Payer: COMMERCIAL

## 2023-09-20 ENCOUNTER — HOSPITAL ENCOUNTER (INPATIENT)
Age: 63
LOS: 1 days | Discharge: HOME | DRG: 066 | End: 2023-09-21
Attending: EMERGENCY MEDICINE | Admitting: FAMILY MEDICINE
Payer: COMMERCIAL

## 2023-09-20 ENCOUNTER — HOSPITAL ENCOUNTER (OUTPATIENT)
Dept: PHYSICAL THERAPY | Age: 63
Setting detail: RECURRING SERIES
Discharge: HOME | End: 2023-09-23
Payer: COMMERCIAL

## 2023-09-20 DIAGNOSIS — I63.9 ACUTE CVA (CEREBROVASCULAR ACCIDENT) (HCC): Primary | ICD-10-CM

## 2023-09-20 PROBLEM — E78.2 MIXED HYPERLIPIDEMIA: Status: ACTIVE | Noted: 2022-01-25

## 2023-09-20 PROBLEM — I10 HTN (HYPERTENSION): Status: ACTIVE | Noted: 2023-09-20

## 2023-09-20 PROBLEM — F17.200 SMOKING: Status: ACTIVE | Noted: 2022-01-25

## 2023-09-20 LAB
ANION GAP SERPL CALC-SCNC: 3 MMOL/L (ref 2–11)
BUN SERPL-MCNC: 10 MG/DL (ref 8–23)
CALCIUM SERPL-MCNC: 8.7 MG/DL (ref 8.3–10.4)
CHLORIDE SERPL-SCNC: 115 MMOL/L (ref 101–110)
CO2 SERPL-SCNC: 22 MMOL/L (ref 21–32)
CREAT SERPL-MCNC: 1 MG/DL (ref 0.8–1.5)
EKG ATRIAL RATE: 68 BPM
EKG DIAGNOSIS: NORMAL
EKG P AXIS: 35 DEGREES
EKG P-R INTERVAL: 156 MS
EKG Q-T INTERVAL: 398 MS
EKG QRS DURATION: 96 MS
EKG QTC CALCULATION (BAZETT): 423 MS
EKG R AXIS: -46 DEGREES
EKG T AXIS: 24 DEGREES
EKG VENTRICULAR RATE: 68 BPM
ERYTHROCYTE [DISTWIDTH] IN BLOOD BY AUTOMATED COUNT: 13.5 % (ref 11.9–14.6)
GLUCOSE BLD STRIP.AUTO-MCNC: 113 MG/DL (ref 65–100)
GLUCOSE SERPL-MCNC: 108 MG/DL (ref 65–100)
HCT VFR BLD AUTO: 36.6 % (ref 41.1–50.3)
HGB BLD-MCNC: 12.2 G/DL (ref 13.6–17.2)
INR BLD: 1 (ref 0.9–1.2)
MAGNESIUM SERPL-MCNC: 2.2 MG/DL (ref 1.8–2.4)
MCH RBC QN AUTO: 33 PG (ref 26.1–32.9)
MCHC RBC AUTO-ENTMCNC: 33.3 G/DL (ref 31.4–35)
MCV RBC AUTO: 98.9 FL (ref 82–102)
NRBC # BLD: 0 K/UL (ref 0–0.2)
PLATELET # BLD AUTO: 229 K/UL (ref 150–450)
PMV BLD AUTO: 10.3 FL (ref 9.4–12.3)
POTASSIUM SERPL-SCNC: 4.1 MMOL/L (ref 3.5–5.1)
PT BLD: 12.1 SECS (ref 9.6–11.6)
RBC # BLD AUTO: 3.7 M/UL (ref 4.23–5.6)
SERVICE CMNT-IMP: ABNORMAL
SODIUM SERPL-SCNC: 140 MMOL/L (ref 133–143)
WBC # BLD AUTO: 4.4 K/UL (ref 4.3–11.1)

## 2023-09-20 PROCEDURE — 6370000000 HC RX 637 (ALT 250 FOR IP): Performed by: STUDENT IN AN ORGANIZED HEALTH CARE EDUCATION/TRAINING PROGRAM

## 2023-09-20 PROCEDURE — 2580000003 HC RX 258: Performed by: EMERGENCY MEDICINE

## 2023-09-20 PROCEDURE — 6360000004 HC RX CONTRAST MEDICATION: Performed by: EMERGENCY MEDICINE

## 2023-09-20 PROCEDURE — 97110 THERAPEUTIC EXERCISES: CPT

## 2023-09-20 PROCEDURE — 99285 EMERGENCY DEPT VISIT HI MDM: CPT

## 2023-09-20 PROCEDURE — 83735 ASSAY OF MAGNESIUM: CPT

## 2023-09-20 PROCEDURE — 82962 GLUCOSE BLOOD TEST: CPT

## 2023-09-20 PROCEDURE — 85027 COMPLETE CBC AUTOMATED: CPT

## 2023-09-20 PROCEDURE — 70498 CT ANGIOGRAPHY NECK: CPT

## 2023-09-20 PROCEDURE — 70551 MRI BRAIN STEM W/O DYE: CPT

## 2023-09-20 PROCEDURE — 70450 CT HEAD/BRAIN W/O DYE: CPT

## 2023-09-20 PROCEDURE — 93010 ELECTROCARDIOGRAM REPORT: CPT | Performed by: INTERNAL MEDICINE

## 2023-09-20 PROCEDURE — 93005 ELECTROCARDIOGRAM TRACING: CPT | Performed by: EMERGENCY MEDICINE

## 2023-09-20 PROCEDURE — 1100000000 HC RM PRIVATE

## 2023-09-20 PROCEDURE — 6360000002 HC RX W HCPCS: Performed by: FAMILY MEDICINE

## 2023-09-20 PROCEDURE — 80048 BASIC METABOLIC PNL TOTAL CA: CPT

## 2023-09-20 PROCEDURE — 85610 PROTHROMBIN TIME: CPT

## 2023-09-20 PROCEDURE — 99222 1ST HOSP IP/OBS MODERATE 55: CPT | Performed by: STUDENT IN AN ORGANIZED HEALTH CARE EDUCATION/TRAINING PROGRAM

## 2023-09-20 PROCEDURE — 6370000000 HC RX 637 (ALT 250 FOR IP): Performed by: FAMILY MEDICINE

## 2023-09-20 RX ORDER — ACETAMINOPHEN 650 MG/1
650 SUPPOSITORY RECTAL EVERY 4 HOURS PRN
Status: DISCONTINUED | OUTPATIENT
Start: 2023-09-20 | End: 2023-09-21 | Stop reason: HOSPADM

## 2023-09-20 RX ORDER — ASPIRIN 81 MG/1
81 TABLET, CHEWABLE ORAL DAILY
Status: DISCONTINUED | OUTPATIENT
Start: 2023-09-20 | End: 2023-09-21 | Stop reason: HOSPADM

## 2023-09-20 RX ORDER — SODIUM CHLORIDE 0.9 % (FLUSH) 0.9 %
5-40 SYRINGE (ML) INJECTION EVERY 12 HOURS SCHEDULED
Status: DISCONTINUED | OUTPATIENT
Start: 2023-09-20 | End: 2023-09-21 | Stop reason: HOSPADM

## 2023-09-20 RX ORDER — AMLODIPINE BESYLATE 2.5 MG/1
2.5 TABLET ORAL DAILY
COMMUNITY
Start: 2023-08-30

## 2023-09-20 RX ORDER — POLYETHYLENE GLYCOL 3350 17 G/17G
17 POWDER, FOR SOLUTION ORAL DAILY PRN
Status: DISCONTINUED | OUTPATIENT
Start: 2023-09-20 | End: 2023-09-21 | Stop reason: HOSPADM

## 2023-09-20 RX ORDER — ASPIRIN 81 MG/1
81 TABLET ORAL DAILY
COMMUNITY
Start: 2021-09-13

## 2023-09-20 RX ORDER — 0.9 % SODIUM CHLORIDE 0.9 %
100 INTRAVENOUS SOLUTION INTRAVENOUS
Status: COMPLETED | OUTPATIENT
Start: 2023-09-20 | End: 2023-09-20

## 2023-09-20 RX ORDER — SODIUM CHLORIDE 0.9 % (FLUSH) 0.9 %
5-40 SYRINGE (ML) INJECTION PRN
Status: DISCONTINUED | OUTPATIENT
Start: 2023-09-20 | End: 2023-09-21 | Stop reason: HOSPADM

## 2023-09-20 RX ORDER — LABETALOL HYDROCHLORIDE 5 MG/ML
10 INJECTION, SOLUTION INTRAVENOUS EVERY 10 MIN PRN
Status: DISCONTINUED | OUTPATIENT
Start: 2023-09-20 | End: 2023-09-21 | Stop reason: HOSPADM

## 2023-09-20 RX ORDER — ACETAMINOPHEN 160 MG
50 TABLET,DISINTEGRATING ORAL
Status: ON HOLD | COMMUNITY
Start: 2023-09-08 | End: 2023-09-21 | Stop reason: HOSPADM

## 2023-09-20 RX ORDER — NICOTINE 21 MG/24HR
1 PATCH, TRANSDERMAL 24 HOURS TRANSDERMAL DAILY
Status: DISCONTINUED | OUTPATIENT
Start: 2023-09-20 | End: 2023-09-21 | Stop reason: HOSPADM

## 2023-09-20 RX ORDER — BISACODYL 10 MG
10 SUPPOSITORY, RECTAL RECTAL DAILY PRN
Status: DISCONTINUED | OUTPATIENT
Start: 2023-09-20 | End: 2023-09-21 | Stop reason: HOSPADM

## 2023-09-20 RX ORDER — SODIUM CHLORIDE 9 MG/ML
INJECTION, SOLUTION INTRAVENOUS PRN
Status: DISCONTINUED | OUTPATIENT
Start: 2023-09-20 | End: 2023-09-21 | Stop reason: HOSPADM

## 2023-09-20 RX ORDER — ACETAMINOPHEN 325 MG/1
650 TABLET ORAL EVERY 4 HOURS PRN
Status: DISCONTINUED | OUTPATIENT
Start: 2023-09-20 | End: 2023-09-21 | Stop reason: HOSPADM

## 2023-09-20 RX ORDER — ENOXAPARIN SODIUM 100 MG/ML
40 INJECTION SUBCUTANEOUS EVERY 24 HOURS
Status: DISCONTINUED | OUTPATIENT
Start: 2023-09-20 | End: 2023-09-21 | Stop reason: HOSPADM

## 2023-09-20 RX ORDER — SODIUM CHLORIDE 0.9 % (FLUSH) 0.9 %
10 SYRINGE (ML) INJECTION
Status: COMPLETED | OUTPATIENT
Start: 2023-09-20 | End: 2023-09-20

## 2023-09-20 RX ORDER — LORAZEPAM 0.5 MG/1
0.5 TABLET ORAL ONCE
Status: COMPLETED | OUTPATIENT
Start: 2023-09-20 | End: 2023-09-20

## 2023-09-20 RX ORDER — CLOPIDOGREL BISULFATE 75 MG/1
75 TABLET ORAL DAILY
Status: DISCONTINUED | OUTPATIENT
Start: 2023-09-20 | End: 2023-09-21 | Stop reason: HOSPADM

## 2023-09-20 RX ADMIN — ACETAMINOPHEN 650 MG: 325 TABLET ORAL at 21:15

## 2023-09-20 RX ADMIN — LORAZEPAM 0.5 MG: 0.5 TABLET ORAL at 16:50

## 2023-09-20 RX ADMIN — SODIUM CHLORIDE, PRESERVATIVE FREE 10 ML: 5 INJECTION INTRAVENOUS at 14:38

## 2023-09-20 RX ADMIN — IOPAMIDOL 60 ML: 755 INJECTION, SOLUTION INTRAVENOUS at 14:37

## 2023-09-20 RX ADMIN — SODIUM CHLORIDE 100 ML: 9 INJECTION, SOLUTION INTRAVENOUS at 14:38

## 2023-09-20 RX ADMIN — ASPIRIN 81 MG 81 MG: 81 TABLET ORAL at 15:36

## 2023-09-20 RX ADMIN — CLOPIDOGREL BISULFATE 75 MG: 75 TABLET ORAL at 15:36

## 2023-09-20 RX ADMIN — ENOXAPARIN SODIUM 40 MG: 100 INJECTION SUBCUTANEOUS at 15:36

## 2023-09-20 ASSESSMENT — LIFESTYLE VARIABLES
HOW OFTEN DO YOU HAVE A DRINK CONTAINING ALCOHOL: MONTHLY OR LESS
HOW MANY STANDARD DRINKS CONTAINING ALCOHOL DO YOU HAVE ON A TYPICAL DAY: 1 OR 2

## 2023-09-20 ASSESSMENT — ENCOUNTER SYMPTOMS
VOMITING: 0
SHORTNESS OF BREATH: 0
NAUSEA: 0
COUGH: 0
DIARRHEA: 0

## 2023-09-20 ASSESSMENT — PAIN - FUNCTIONAL ASSESSMENT: PAIN_FUNCTIONAL_ASSESSMENT: 0-10

## 2023-09-20 ASSESSMENT — PAIN SCALES - GENERAL
PAINLEVEL_OUTOF10: 0
PAINLEVEL_OUTOF10: 6
PAINLEVEL_OUTOF10: 0

## 2023-09-20 NOTE — PROGRESS NOTES
Sonal Piedad  : 1960  Primary: [employer]  Secondary: [address] @ [place] [address]  Phone: [phone]  Fax: [phone]    >OT Visit Info:   Plan Frequency: 1-2 times/week  Certification Period Expiration Date: 23  Total # of Visits to Date: 9  Progress Note Counter: 1     Visit Count: Visit count could not be calculated. Make sure you are using a visit which is associated with an episode. OUTPATIENT OCCUPATIONAL THERAPY: Treatment Note 2023  Episode: (OT - CVA)   Appt Desk      Treatment Diagnosis:  Pain in Left Shoulder (M25.512)  Pain in Left Wrist (M25.532)  Other lack of cordination (R27.8)  Hemiplegia and hemiparesis following cerebral infarction affecting right dominant side (C88.807)  Medical/Referring Diagnosis:  Weakness [R53.1]  Referring Physician:  TATIANA Hernandez - NP  MD Orders:  OT Eval and Treat   Date of Onset:  Onset Date: 23     Allergies:  Patient has no allergy information on record. Restrictions/Precautions:    No data recordedNo data recorded  Medications Last Reviewed:  2023     Interventions Planned: (Treatment may consist of any combination of the following)  Current Treatment Recommendations: Strengthening; ROM; Neuromuscular re-education; Pain management; Safety education & training; Equipment evaluation, education, & procurement; Modalities; Positioning; Dry needling; Self-Care / ADL; Manual Therapy:  STM; Manual Therapy:  Zakiya Snyder; Coordination training; Other (comment) (orthotic management and training)     >Subjective Comments:  Patient states he feels a little \"off\" today. States he ate breakfast.  States when he takes his BP medicine he notices when he takes it.  >Initial:     0/10 >Post Session:     0/10  Medications Last Reviewed:  2023  Updated Objective Findings:   BP: 137/83;  HR: 70  : 127/85; HR: 71

## 2023-09-20 NOTE — ACP (ADVANCE CARE PLANNING)
[facility] Hospitalist Service  At the heart of better care     Advance Care Planning   Admit Date:  2023  2:00 PM   Name:  Dominic Au   Age:  59 y.o. Sex:  male  :  1960   MRN:  [id number]   Room:  [place] [address]    Dominic Au has capacity to make his own decisions:   Yes    Patient / surrogate decision-maker directed code status:  Full Code    Patient or surrogate consented to discussion of the current conditions, workup, management plans, prognosis, and the risk for further deterioration. Time spent: 17 minutes in direct discussion.       Signed:  Aleean Ponce DO

## 2023-09-20 NOTE — ED TRIAGE NOTES
Patient arrives via EMS for weakness/fatigue headache as well as unsteady gait since and slurred speech since this this am. Patient states stroke approximately 1 month ago. Last known well 9 pm last night.

## 2023-09-20 NOTE — H&P
Hospitalist History and Physical   Admit Date:  2023  2:00 PM   Name:  Elba Arvizu   Age:  59 y.o. Sex:  male  :  1960   MRN:  [id number]   Room:  [place] [address]    Presenting/Chief Complaint: Fatigue     Reason(s) for Admission: No admission diagnoses are documented for this encounter. History of Present Illness:   Elba Arvizu is a 59 y.o. male who presented to the ED for cc worsening slurred speech and instablity with ambulation. Last known normal around 9am prior to symptoms. Hx of of left pontine CVA about six weeks ago at [facility]. NIH 1 on arrival. Hx also of tobacco abuse and HTN. States he is still with slurred speech but starting to improve. CT of the head was obtained and shows remote left pontine infarct. CTA of head neck with no large vessel occlusion. The patient was not a candidate for tenecteplase because he is outside the window and symptoms are mild . The patient was not a candidate for mechanical thrombectomy because of low NIH stroke scale. Neurology would like CVA work up and to repeat 21 day dual antiplatelet course. Since failed statin and Zetia, will need repatha outpatient along with smoking cessation. Assessment & Plan: Active Problems:    CVA - CVA work up. Restart ASA and plavix for 21 days. Will need repatha outpatient and to stop smoking. Allow permissive HTN for 24 hours. A1C on 23 5.2. Cholesterol 220, HDL 48 on 23    HTN - Allow permissive HTN for 24 hours. PT/OT evals and PPD needed/ordered? Yes  Diet: No diet orders on file  VTE prophylaxis: Lovenox  Code status: No Order      Non-peripheral Lines and Tubes (if present):             Hospital Problems:  Active Problems:    * No active hospital problems. *  Resolved Problems:    * No resolved hospital problems. *      Past History:     History reviewed. No pertinent past medical history. History reviewed. No pertinent surgical history.      Social History     Tobacco Use    Smoking status:

## 2023-09-20 NOTE — CONSULTS
Consult    Patient: Gregorio Branham MRN: [id number]     YOB: 1960  Age: 59 y.o. Sex: male      Subjective:      Gregorio Branham is a 59 y.o. male who is being seen for code S. The patient was last known normal at 9 AM this morning, about 5 hours ago. He experienced worsening of slurred speech and reported gait instability. Admitted at [facility] about 6 weeks ago for similar symptoms and left-sided weakness, found to have small left pontine infarct. Discharged with aspirin Plavix and statin. Compliant with aspirin and Plavix however not compliant with statin due to undesirable side effects. Apparently has also tried ? Zetia with undesirable side effects. The patient presented to [facility] ED. A code S was called at 157 pm. Neurology arrived to the bedside at 200 pm. Initial NIHSS was 1 for mild dysarhria. A CT of the head was obtained and shows chronic left pontine infarct. On further history patient states that he has experienced abnormal reading patterns about the past 2 weeks, stating that at random he \"misses breaths. \"  He continues to smoke cigarettes. History reviewed. No pertinent past medical history. History reviewed. No pertinent surgical history. History reviewed. No pertinent family history.   Social History     Tobacco Use    Smoking status: Not on file    Smokeless tobacco: Not on file   Substance Use Topics    Alcohol use: Not on file      Current Facility-Administered Medications   Medication Dose Route Frequency Provider Last Rate Last Admin    sodium chloride flush 0.9 % injection 10 mL  10 mL IntraVENous ONCE PRN Patricia Riojas MD        sodium chloride 0.9 % bolus 100 mL  100 mL IntraVENous ONCE PRN Patricia Riojas MD        iopamidol (ISOVUE-370) 76 % injection 60 mL  60 mL IntraVENous ONCE PRN Patricia Riojas MD         Current Outpatient Medications   Medication Sig Dispense Refill    aspirin 81 MG EC tablet Take 1 tablet by mouth daily      amLODIPine (NORVASC) 2.5 MG

## 2023-09-20 NOTE — PROGRESS NOTES
Susanna Jeffries  : 1960  Primary: [employer]  Secondary: Bob Boxer [address] [place] [address] @ [address]  [address] [facility] [address] [facility] [address]  Phone: [phone]  Fax: [phone] Plan Frequency: 2x per week    Plan of Care/Certification Expiration Date: 23      PT Visit Info: Total # of Visits to Date: 7  Progress Note Counter: 7  No Show: 2  Canceled Appointment: 1         OUTPATIENT PHYSICAL THERAPY 2023     Appt Desk   Episode   MyChart      Mr. Michaelle Urban was a same-day cancellation for today's appointment.  He arrived to the clinic and wasn't feeling well, so he was sent to the ER during his OT appointment prior to [facility], PT    Future Appointments   Date Time Provider [address]   2023  2:30 PM Bobbi [facility]

## 2023-09-20 NOTE — ED PROVIDER NOTES
Emergency Department Provider Note       PCP: Danise Angelucci, APRN - NP   Age: 59 y.o. Sex: male     Enio Ramos Admitted 09/20/2023 03:12:50 PM     No diagnosis found. Medical Decision Making     He is outside our tPA window but I did call a code stroke. Complexity of Problems Addressed:  1 or more acute illnesses that pose a threat to life or bodily function. Data Reviewed and Analyzed:  I independently ordered and reviewed each unique test.  I reviewed external records: provider visit note from PCP. The patients assessment required an independent historian: Wife. The reason they were needed is important historical information not provided by the patient. I interpreted the CT Scan no obvious intracranial bleed. Discussion of management or test interpretation. I discussed the possibility of stroke with the patient and his wife and our plan to get neurology involved. The patient was admitted and I have discussed patient management with the admitting provider. The management of this patient was discussed with an external consultant.      ===================================================================  This patient is critically ill and there is a high probability of of imminent or life threatening deterioration in the patient's condition without immediate management. The nature of the patient's clinical problem is: CVA    I have spent 30 minutes in direct patient care, documentation, review of labs/xrays/old records, discussion with Family, Staff, Colleague, Nursing . The time involved in the performance of separately reportable procedures was not counted toward critical care time. Daylin Denise MD; 9/20/2023 @3:18 PM  ===================================================================          Risk of Complications and/or Morbidity of Patient Management:  Shared medical decision making was utilized in creating the patients health plan today.     ED Course as of 09/20/23

## 2023-09-21 VITALS
BODY MASS INDEX: 27.09 KG/M2 | RESPIRATION RATE: 16 BRPM | HEART RATE: 70 BPM | OXYGEN SATURATION: 99 % | SYSTOLIC BLOOD PRESSURE: 126 MMHG | DIASTOLIC BLOOD PRESSURE: 95 MMHG | TEMPERATURE: 98.1 F | WEIGHT: 200 LBS | HEIGHT: 72 IN

## 2023-09-21 LAB
ANION GAP SERPL CALC-SCNC: 5 MMOL/L (ref 2–11)
BUN SERPL-MCNC: 7 MG/DL (ref 8–23)
CALCIUM SERPL-MCNC: 8.2 MG/DL (ref 8.3–10.4)
CHLORIDE SERPL-SCNC: 115 MMOL/L (ref 101–110)
CO2 SERPL-SCNC: 23 MMOL/L (ref 21–32)
CREAT SERPL-MCNC: 1 MG/DL (ref 0.8–1.5)
ERYTHROCYTE [DISTWIDTH] IN BLOOD BY AUTOMATED COUNT: 13.4 % (ref 11.9–14.6)
GLUCOSE SERPL-MCNC: 124 MG/DL (ref 65–100)
HCT VFR BLD AUTO: 35.8 % (ref 41.1–50.3)
HGB BLD-MCNC: 11.8 G/DL (ref 13.6–17.2)
HIV 1+2 AB+HIV1 P24 AG SERPL QL IA: NONREACTIVE
HIV 1/2 RESULT COMMENT: NORMAL
MCH RBC QN AUTO: 33 PG (ref 26.1–32.9)
MCHC RBC AUTO-ENTMCNC: 33 G/DL (ref 31.4–35)
MCV RBC AUTO: 100 FL (ref 82–102)
NRBC # BLD: 0 K/UL (ref 0–0.2)
PLATELET # BLD AUTO: 220 K/UL (ref 150–450)
PMV BLD AUTO: 10.9 FL (ref 9.4–12.3)
POTASSIUM SERPL-SCNC: 4.2 MMOL/L (ref 3.5–5.1)
RBC # BLD AUTO: 3.58 M/UL (ref 4.23–5.6)
SODIUM SERPL-SCNC: 143 MMOL/L (ref 133–143)
WBC # BLD AUTO: 2.9 K/UL (ref 4.3–11.1)

## 2023-09-21 PROCEDURE — 87389 HIV-1 AG W/HIV-1&-2 AB AG IA: CPT

## 2023-09-21 PROCEDURE — 6370000000 HC RX 637 (ALT 250 FOR IP): Performed by: FAMILY MEDICINE

## 2023-09-21 PROCEDURE — 97535 SELF CARE MNGMENT TRAINING: CPT

## 2023-09-21 PROCEDURE — 97161 PT EVAL LOW COMPLEX 20 MIN: CPT

## 2023-09-21 PROCEDURE — 2580000003 HC RX 258: Performed by: FAMILY MEDICINE

## 2023-09-21 PROCEDURE — 97530 THERAPEUTIC ACTIVITIES: CPT

## 2023-09-21 PROCEDURE — 85027 COMPLETE CBC AUTOMATED: CPT

## 2023-09-21 PROCEDURE — 92610 EVALUATE SWALLOWING FUNCTION: CPT

## 2023-09-21 PROCEDURE — 97165 OT EVAL LOW COMPLEX 30 MIN: CPT

## 2023-09-21 PROCEDURE — 36415 COLL VENOUS BLD VENIPUNCTURE: CPT

## 2023-09-21 PROCEDURE — 6370000000 HC RX 637 (ALT 250 FOR IP): Performed by: STUDENT IN AN ORGANIZED HEALTH CARE EDUCATION/TRAINING PROGRAM

## 2023-09-21 PROCEDURE — 80048 BASIC METABOLIC PNL TOTAL CA: CPT

## 2023-09-21 RX ORDER — BUPROPION HYDROCHLORIDE 150 MG/1
150 TABLET, EXTENDED RELEASE ORAL 2 TIMES DAILY
Qty: 180 TABLET | Refills: 1 | Status: SHIPPED | OUTPATIENT
Start: 2023-09-21

## 2023-09-21 RX ORDER — BUPROPION HYDROCHLORIDE 150 MG/1
150 TABLET ORAL DAILY
Status: DISCONTINUED | OUTPATIENT
Start: 2023-09-21 | End: 2023-09-21 | Stop reason: HOSPADM

## 2023-09-21 RX ORDER — CLOPIDOGREL BISULFATE 75 MG/1
75 TABLET ORAL DAILY
Qty: 20 TABLET | Refills: 0 | Status: SHIPPED | OUTPATIENT
Start: 2023-09-22 | End: 2023-10-12

## 2023-09-21 RX ORDER — NICOTINE 21 MG/24HR
1 PATCH, TRANSDERMAL 24 HOURS TRANSDERMAL DAILY
Qty: 30 PATCH | Refills: 3 | Status: SHIPPED | OUTPATIENT
Start: 2023-09-22

## 2023-09-21 RX ADMIN — SODIUM CHLORIDE, PRESERVATIVE FREE 5 ML: 5 INJECTION INTRAVENOUS at 01:40

## 2023-09-21 RX ADMIN — SODIUM CHLORIDE, PRESERVATIVE FREE 5 ML: 5 INJECTION INTRAVENOUS at 08:22

## 2023-09-21 RX ADMIN — CLOPIDOGREL BISULFATE 75 MG: 75 TABLET ORAL at 08:22

## 2023-09-21 RX ADMIN — ACETAMINOPHEN 650 MG: 325 TABLET ORAL at 08:22

## 2023-09-21 RX ADMIN — ASPIRIN 81 MG 81 MG: 81 TABLET ORAL at 08:22

## 2023-09-21 ASSESSMENT — PAIN SCALES - GENERAL
PAINLEVEL_OUTOF10: 3
PAINLEVEL_OUTOF10: 0

## 2023-09-21 ASSESSMENT — PAIN DESCRIPTION - ONSET: ONSET: GRADUAL

## 2023-09-21 ASSESSMENT — PAIN DESCRIPTION - LOCATION: LOCATION: RIB CAGE

## 2023-09-21 ASSESSMENT — PAIN - FUNCTIONAL ASSESSMENT: PAIN_FUNCTIONAL_ASSESSMENT: ACTIVITIES ARE NOT PREVENTED

## 2023-09-21 ASSESSMENT — PAIN DESCRIPTION - PAIN TYPE: TYPE: ACUTE PAIN

## 2023-09-21 ASSESSMENT — PAIN DESCRIPTION - ORIENTATION: ORIENTATION: LEFT

## 2023-09-21 ASSESSMENT — PAIN DESCRIPTION - DESCRIPTORS: DESCRIPTORS: ACHING

## 2023-09-21 ASSESSMENT — PAIN DESCRIPTION - FREQUENCY: FREQUENCY: INTERMITTENT

## 2023-09-21 NOTE — PLAN OF CARE
Problem: Discharge Planning  Goal: Discharge to home or other facility with appropriate resources  9/21/2023 1010 by Edward Daily RN  Outcome: Progressing  Flowsheets (Taken 9/21/2023 0800)  Discharge to home or other facility with appropriate resources: Identify barriers to discharge with patient and caregiver  9/21/2023 0324 by Dorothy oRmano RN  Outcome: Progressing     Problem: Pain  Goal: Verbalizes/displays adequate comfort level or baseline comfort level  9/21/2023 1010 by Edward Daily RN  Outcome: Progressing  Flowsheets (Taken 9/21/2023 0822)  Verbalizes/displays adequate comfort level or baseline comfort level:   Encourage patient to monitor pain and request assistance   Assess pain using appropriate pain scale  9/21/2023 0324 by Dorothy Romano RN  Outcome: Progressing     Problem: Safety - Adult  Goal: Free from fall injury  9/21/2023 1010 by Edward Daily RN  Outcome: Progressing  Flowsheets (Taken 9/21/2023 0800)  Free From Fall Injury: Instruct family/caregiver on patient safety  9/21/2023 0324 by Dorothy Romano RN  Outcome: Progressing     Problem: Chronic Conditions and Co-morbidities  Goal: Patient's chronic conditions and co-morbidity symptoms are monitored and maintained or improved  Outcome: Progressing  Flowsheets (Taken 9/21/2023 0800)  Care Plan - Patient's Chronic Conditions and Co-Morbidity Symptoms are Monitored and Maintained or Improved: Monitor and assess patient's chronic conditions and comorbid symptoms for stability, deterioration, or improvement

## 2023-09-21 NOTE — ED NOTES
TRANSFER - OUT REPORT:    Verbal report given to RN on Vignesh Rojas  being transferred to 7th floor for routine progression of patient care       Report consisted of patient's Situation, Background, Assessment and   Recommendations(SBAR). Information from the following report(s) ED SBAR was reviewed with the receiving nurse. Cely Fall Assessment:    Presents to emergency department  because of falls (Syncope, seizure, or loss of consciousness): No  Age > 70: No  Altered Mental Status, Intoxication with alcohol or substance confusion (Disorientation, impaired judgment, poor safety awaremess, or inability to follow instructions): No  Impaired Mobility: Ambulates or transfers with assistive devices or assistance; Unable to ambulate or transer.: Yes  Nursing Judgement: Yes          Lines:   Peripheral IV 09/20/23 Left Antecubital (Active)        Opportunity for questions and clarification was provided.       Patient transported with:  Registered Nurse          Mady Sykes RN  09/21/23 7716

## 2023-09-21 NOTE — PROGRESS NOTES
TRANSFER - IN REPORT:    Verbal report received from BENY THOMSON on [employer]  being received from [facility] ADELINA, RN for routine progression of patient care      Report consisted of patient's Situation, Background, Assessment and   Recommendations(SBAR). Information from the following report(s) Nurse Handoff Report, ED SBAR, and Neuro Assessment was reviewed with the receiving nurse. Opportunity for questions and clarification was provided. Assessment to be completed upon patient's arrival to unit and care assumed.

## 2023-09-21 NOTE — PROGRESS NOTES
Neurology Progress Note       Interval History:   No events overnight. Patient feels generally weak and somnolent. Examination: Pertinent positives and negatives include:  Relatively symmetric strength throughout, during all extremities spontaneously. Mild dysarthria, no obvious facial asymmetry  He appears somnolent but easily arousable and attentive, answering questions appropriately. Assessment and Plan:   27-year-old male presented with several hours of worsening slurred speech gait instability, and found to have acute ischemic infarct in the right pontine region. Of note, he is only 6 weeks out from a recent left acute pontine infarct. The etiology is likely small vessel ischemic disease in the setting of his tobacco use and poorly controlled hyperlipidemia. He has had issues tolerating p.o. lipid-lowering agents, including statins and Zetia. Given now bilateral pontine involvement, his cluster of symptoms includes an abnormal clustered breathing pattern along with general somnolence, in addition to his other motor deficits. I do expect the symptoms to improve over time, however it is essential that he refrain from any further tobacco use, and I do recommend that he start subcutaneous injections of PCSK9 inhibitor as soon as possible (eg Repatha). I notified our NP Félix Freeman to help arrange this from our clinic (referral sent). He should also complete another 21-day course of dual antiplatelet therapy, followed by antiplatelet monotherapy. Regarding his fatigue, I believe that a trial of bupropion is reasonable for him as a possible stimulant and to assist with his smoking cessation. Otherwise no further recommendations at this time and neurology will sign off, however please do not hesitate to call back with additional questions or concerns should they arise.        Jordan Connor, DO  Neurology      Cumulative time spent today was 45 minutes which included chart review, examining the patient, obtaining history from patient/family/other providers, reviewing imaging, and counseling the patient and/or family on medical condition.

## 2023-09-21 NOTE — PROGRESS NOTES
I have reviewed discharge instructions with the patient and spouse. The patient verbalized understanding. IV removed. Telebox removed.

## 2023-09-21 NOTE — PROGRESS NOTES
SPEECH LANGUAGE PATHOLOGY: DYSPHAGIA  Initial Assessment    NAME: Kimi Melendez  : 1960  MRN: [id number]    ADMISSION DATE: 2023  PRIMARY DIAGNOSIS: Acute CVA (cerebrovascular accident) ([address])  Acute CVA (cerebrovascular accident) ([address]) [I63.9]    ICD-10: Treatment Diagnosis: R13.11 Dysphagia, Oral Phase    RECOMMENDATIONS   Diet:  Diet Solids Recommendation: Regular  Liquid Consistency Recommendation: Thin    Medications: PO     Compensatory Swallowing Strategies:  upright, small bites/sips, slow rate     Patient continues to require skilled intervention: Yes. Recommend ongoing speech therapy services during this hospitalization and next level of care  for dysarthria       ASSESSMENT    Dysphagia Diagnosis: Swallow function appears [facility]  Recommend regular diet and thin liquids. No dysphagia treatment indicated. Recommend  follow up for dysarthria. GENERAL    History of Present Injury/Illness: Mr. Valdez Ingram  has no past medical history on file. Cedrick Stephens He also  has no past surgical history on file. Subjective: upright in bed, pleasant. reports feeling better today. Communication Observation: Functional (mild dysarthria)  Follows Directions: Complex    Prior Dysphagia History: none   Prior instrumental assessment: none    Speech pathology history: patient currently seeing OP SLP for dysarthria    Current Diet : Regular  Current Liquid Diet : Thin  Patient Complaint: worsening slurred speech and difficulty walking, although reports doing better today                   Pain:   Patient does not c/o pain                                          OBJECTIVE    Patient Positioning: Upright in bed   Oral Motor   Labial: Decreased rate  Oral Hygiene: Clean  Lingual: Decreased rate  Dentition: Adequate             Oropharyngeal Phase:   Patient consumed thin liquid via cup and straw, mixed, and solid consistencies. Appropriate oral prep and clearance with all textures.  Appeared to demonstrate timely

## 2023-09-21 NOTE — PROGRESS NOTES
ACUTE OCCUPATIONAL THERAPY GOALS:   (Developed with and agreed upon by patient and/or caregiver.)  1. Patient will complete lower body bathing and dressing with INDEPENDENCE.    2. Patient will complete toileting with INDEPENDENCE. 3. Patient will complete grooming ADL standing at sink with INDEPENDENCE.  4. Patient will tolerate 25 minutes of OT treatment with 1-2 rest breaks to increase activity tolerance for ADLs. 5. Patient will complete functional transfers with INDEPENDENCE. 6. Patient will tolerate 10 minutes BUE exercises to increase strength for safe, functional transfers. Timeframe: 7 visits     OCCUPATIONAL THERAPY Initial Assessment and Daily Note       OT Visit Days: 1  Acknowledge Orders  Time  OT Charge Capture  Rehab Caseload Tracker      Gareth Lancaster is a 59 y.o. male   PRIMARY DIAGNOSIS: Acute CVA (cerebrovascular accident) ([address])  Acute CVA (cerebrovascular accident) ([address]) [I63.9]       Reason for Referral: Generalized Muscle Weakness (M62.81)  Difficulty in walking, Not elsewhere classified (R26.2)  Other abnormalities of gait and mobility (R26.89)  Inpatient: Payor: Ana Maria Allen / Plan: Ana Maria Allen / Product Type: *No Product type* /     ASSESSMENT:     REHAB RECOMMENDATIONS:   Recommendation to date pending progress:  Setting:  Outpatient Therapy--resume services    Equipment:    None     ASSESSMENT:  Mr. Franklny Chauhan is a 60 y/o male presents from OP OT appointment with slurred speech and R sided weakness. Pt with hx of CVA 6 weeks ago. Pt is undergoing CVA workup, MRI revealed new stroke in alpesh. At baseline pt lives with his wife, is independent all ADLs and drives. Today pt presents with decreased activity tolerance and balance impacting ADLs. Pt overall SBA no AD for functional transfers and mobility of household distances in hallway. Pt able to don socks EOB with SBA for dynamic balance.  Pt reports he feels weaker than normal, but felt better after

## 2023-10-03 NOTE — PROGRESS NOTES
Yanelis Salas  : 1960  Primary: [employer]  Secondary: Natalia Roberts [facility] [address] @ [address] [place] [address]  Phone: [phone]  Fax: [phone] Plan Frequency: 1-2 times/week    Certification Period Expiration Date: 24      SLP VISIT INFO  Effective Dates:    10/4/23 to 24   Frequency/Duration  1-2 time(s) a week for 90 days  SLP Visit Info:  No Show: 1 ()  Canceled Appointment: 3 ( lwbs went to ER with slurred speech)  Total # of Visits to Date: 6 (10/4)     OUTPATIENT SPEECH PATHOLOGY NOTE:Daily Note 10/4/2023  Appt Desk   Episode      Treatment Diagnosis: R47.1 Dysarthria and Anarthria  Medical/Referring Diagnosis:  Weakness [R53.1]  Referring Physician:  Corbett Bumpers, AP* MD Orders:  Eval and Treat   Allergies:  Atorvastatin  Medications Last Reviewed:  10/4/2023  Subjective Comments:  Patient hospitalized  with confirmed right alpesh infarct (old small infarct in the left alpesh, and old lacunar infarct in the left thalamus). Pain:  Patient does not c/o pain. Interventions Planned: (Treatment may consist of any combination of the following)  Oral motor exercises, Motor speech based treatment, Training in compensatory strategies, and Education  GOALS: (Goals have been discussed and agreed upon with patient.)  Short-Term Functional Goals: Time Frame: 8 weeks  Patient will perform oral motor exercises x5 each with >90% accuracy to improve strength and coordination of oral articulators. Patient will use pacing and pausing techniques at phrase/sentence/paragraph level with min cues 80% of the time. Patient will articulate consonants at phrase/sentence/paragraph level with 90% accuracy with min cues. Patient will independently use compensatory strategies at conversational level to improve speech intelligibility to >90% accuracy.   Patient will perform home exercise program based on

## 2023-10-04 ENCOUNTER — APPOINTMENT (OUTPATIENT)
Dept: PHYSICAL THERAPY | Age: 63
End: 2023-10-04
Payer: COMMERCIAL

## 2023-10-04 ENCOUNTER — HOSPITAL ENCOUNTER (OUTPATIENT)
Dept: PHYSICAL THERAPY | Age: 63
Setting detail: RECURRING SERIES
Discharge: HOME | End: 2023-10-07
Payer: COMMERCIAL

## 2023-10-04 PROCEDURE — 97168 OT RE-EVAL EST PLAN CARE: CPT

## 2023-10-04 PROCEDURE — 97110 THERAPEUTIC EXERCISES: CPT

## 2023-10-04 PROCEDURE — 92507 TX SP LANG VOICE COMM INDIV: CPT

## 2023-10-04 ASSESSMENT — PAIN SCALES - GENERAL: PAINLEVEL_OUTOF10: 0

## 2023-10-04 NOTE — THERAPY RECERTIFICATION
Dementia 1-19        ASSESSMENT    Re-evaluation:    Patient returns s/p another acute CVA 9/20/23, this time in R alpesh, after attending 6 outpatient speech therapy sessions. Patient's maximum phonation time decreased from 17.24 seconds to 13.36 seconds, AMR /p/ rates decreased to 6.2 from previous measurement of 6.8. Speech characteristics include decreased coordination of respiration and phonation, blended word boundaries, variable rate of speech, imprecision with articulation. He also demonstrates mild cognitive impairment, specifically with memory recall and divergent naming. Patient recommended speech therapy services to address dysarthria and cognitive impairments, in efforts to improve patient's ability to effectively communicate with family and in environmental settings. Problem List: (Impacting functional limitations):    CVA  Dysarthria  Memory recall  Therapy Prognosis:   Good  RECOMMENDATIONS   Recommendations:    Yes. Speech therapy services are clinically indicated at this time to address dysarthria and cognitive linguistic skills. PLAN    Interventions Planned (Treatment may consist of any combination of the following):    Cognitive-linguistic based treatment, Oral motor exercises, Motor speech based treatment, Training in compensatory strategies, and Education  Goals: (Goals have been discussed and agreed upon with patient.)  Short-Term Functional Goals: Time Frame: 8 weeks  Patient will perform oral motor exercises x5 each with >90% accuracy to improve strength and coordination of oral articulators. Patient will use pacing and pausing techniques at phrase/sentence/paragraph level with min cues 80% of the time. Patient will articulate consonants at phrase/sentence/paragraph level with 90% accuracy with min cues. Patient will independently use compensatory strategies at conversational level to improve speech intelligibility to >90% accuracy.   Patient will perform home exercise program based

## 2023-10-04 NOTE — PROGRESS NOTES
Billable Duration: 23  minutes  + untimed re-evaluation  OT Individual Minutes  Time In: 1430  Time Out: [address]  Minutes: BRYCE Mark    Post Session Pain  Charge Capture   POC Link  Treatment Note Link  MD Guidelines  MyChart    Future Appointments   Date Time Provider [address]   10/13/2023  1:00 PM Olive Deluca, PT [facility]

## 2023-10-13 ENCOUNTER — HOSPITAL ENCOUNTER (OUTPATIENT)
Dept: PHYSICAL THERAPY | Age: 63
Setting detail: RECURRING SERIES
Discharge: HOME | End: 2023-10-16
Payer: COMMERCIAL

## 2023-10-13 PROCEDURE — 97110 THERAPEUTIC EXERCISES: CPT

## 2023-10-13 PROCEDURE — 97164 PT RE-EVAL EST PLAN CARE: CPT

## 2023-10-13 ASSESSMENT — PAIN SCALES - GENERAL: PAINLEVEL_OUTOF10: 0

## 2023-10-13 NOTE — PROGRESS NOTES
Desiree Shah  : 1960  Primary: [employer]  Secondary: [address]  [address] [place], [place] [address]  Jon Sorto [address]  Phone: [phone]  Fax: [phone] Plan Frequency: 2x per week    Plan of Care/Certification Expiration Date: 24      PT Visit Info:  Plan Frequency: 2x per week  Plan of Care/Certification Expiration Date: 24  Total # of Visits to Date: 8  Progress Note Counter: 8  No Show: 2  Canceled Appointment: 1      Visit Count: 8               OUTPATIENT PHYSICAL THERAPY:             OP NOTE TYPE: Re-eval & Progress Assessment 10/13/2023               Episode (PT - CVA) Appt Desk         Treatment Diagnoses:    Generalized Muscle Weakness (M62.81)  Difficulty in walking, Not elsewhere classified (R26.2)  Other abnormalities of gait and mobility (R26.89)  Hemiplegia and hemiparesis following cerebral infarction affecting right dominant side (I69.351)  Hemiplegia and hemiparesis following cerebral infarction affecting left non-dominant side (T91.796)  Medical/Referring Diagnosis:   Weakness [R53.1]  Referring Physician: TATIANA Reynolds NP, MD Orders: PT Eval and Treat   Return MD Appt: TBD  Date of Onset:      Allergies: Atorvastatin  Restrictions/Precautions:        Medications Last Reviewed: 10/13/2023     SUBJECTIVE   History of Injury/Illness (Reason for Referral):  Mr. Mejia Rosado presents to outpatient PT s/p CVA affecting his R side. He says he woke up on 23 and noticed staggering during gait and slurred speech. He said he was going into work when he was encouraged to go to the doctor. He says the stroke affected his R side primarily, but he also notices some LUE deficits with his shoulder and hand specifically. He says he has a hard time grasping and holding onto something with his L hand without dropping it. He says that his UE deficits are greater than his LE deficits.  He
on bolster 2 x 15 x 3 sec hold, both legs on green exercise ball 1 x 15 with green band  1 x 15 with band plus 7 lb weight      Clam shells    2 x 15 each side  Green TB at knees      Sit to stands   On rebounder 10 x       NuStep   8 minutes, L8 SF 8 minutes  L8 - bumped to L4, 5 min  Spm: between 45-50 L8 for 3 minutes,  L7 for 2 minutes  L6 for 3 minutes 7 minutes, L4  Increase LE strength   Side steps against resistance   20 ft x 2 G TB at ankles side step  Monster walks 55' x 2  30 ft x 2 G TB at ankles side step  Monster walks 30' x 2    Leg press machine    55# x 15 DL  70# x 15 x 2  Pt says he feels like both legs are working equally DL 70 # x 16 x 3  Seat at 10 1 x 10 B 55#  1 x 10 B 70#  1 x 10 B 85#   Incline board stretch 2 x 30 sec  Knees flexed and straight 3 x 30 sec hold knees flexed and extended - UE support  3 x 30 sec hold knees flexed and extended    Lateral step ups      1 x 10 each side  6\" step   Standing heel raises      1 x 15 B  Toes elevated 2\"   Standing toe raises         Incline board stretch         Knee extension - cable machine       Knee flexion - cable machine         Goblet squats                         HEP   - Supine Bridge  - 1 x daily - 7 x weekly - 3 sets - 15 reps  - Clam Shells  - 1 x daily - 3 x weekly - 3 sets - 15 reps  - Sit to Stand with Arms Crossed  - 2 x daily - 7 x weekly - 2 sets - 10 reps    Treatment/Session Summary:    >Treatment Assessment: Patient undergoing re-eval and progress assessment this visit; see other note for details. He is doing well considering a second CVA affecting the contralateral side since his initial visit with PT this episode. PT encouraged him to continue with exercising at home. Communication/Consultation: PT encouraged patient to performing HEP consistently. Equipment provided: Red and green theraband  Recommendations/Intent for next treatment session: Next visit will focus on strengthening, gait, and balance.     >Total Treatment

## 2023-11-10 ENCOUNTER — HOSPITAL ENCOUNTER (OUTPATIENT)
Dept: PHYSICAL THERAPY | Age: 63
Setting detail: RECURRING SERIES
Discharge: HOME | End: 2023-11-13
Payer: COMMERCIAL

## 2023-11-10 PROCEDURE — 97140 MANUAL THERAPY 1/> REGIONS: CPT

## 2023-11-10 PROCEDURE — 97110 THERAPEUTIC EXERCISES: CPT

## 2023-11-10 ASSESSMENT — PAIN SCALES - GENERAL: PAINLEVEL_OUTOF10: 2

## 2023-11-10 ASSESSMENT — PAIN DESCRIPTION - LOCATION: LOCATION: FINGER (COMMENT WHICH ONE)

## 2023-11-10 NOTE — PROGRESS NOTES
Brian Steen  : 1960  Primary: [employer]  Secondary: [address] @ [place] [address]  Phone: [phone]  Fax: [phone]    >OT Visit Info:   Plan Frequency: 1-2 times/week  Certification Period Expiration Date: 24  Total # of Visits Approved: 12 (10/4/23 to 2024)  Total # of Visits to Date: 10  Progress Note Counter: 2  Progress Note Due Date: 23     Visit Count: Visit count could not be calculated. Make sure you are using a visit which is associated with an episode. OUTPATIENT OCCUPATIONAL THERAPY: Treatment Note 11/10/2023  Episode: (OT - CVA)   Appt Desk      Treatment Diagnosis:  Pain in Left Shoulder (M25.512)  Pain in Left Wrist (M25.532)  Other lack of cordination (R27.8)  Hemiplegia and hemiparesis following cerebral infarction affecting right dominant side (X24.926)  Medical/Referring Diagnosis:  Weakness [R53.1]  Referring Physician:  TATIANA Jean-Baptiste NP, MD Orders:  OT Eval and Treat   Date of Onset:  Onset Date: 23     Allergies:  Atorvastatin  Restrictions/Precautions:    No data recordedNo data recorded  Medications Last Reviewed:  11/10/2023     Interventions Planned: (Treatment may consist of any combination of the following)  Current Treatment Recommendations: Strengthening; ROM; Neuromuscular re-education; Pain management; Safety education & training; Equipment evaluation, education, & procurement; Modalities; Positioning; Dry needling; Self-Care / ADL; Manual Therapy:  STM; Manual Therapy:  Jaquadriana Canary; Coordination training; Other (comment) (orthotic management and training)     >Subjective Comments:  Patient states he went on vacation recently. Trying to slowly wean himself from smoking habit.   >Initial:      (L thumb)/10 >Post Session:      (no specific rating given)/10  Medications Last Reviewed:  11/10/2023  Updated

## 2023-11-10 NOTE — PROGRESS NOTES
Desiree Shah  : 1960  Primary: [employer]  Secondary: [address] @ [place] [address]  Phone: [phone]  Fax: [phone]    OT Visit Info:  Plan Frequency: 1-2 times/week  Certification Period Expiration Date: 24  Total # of Visits Approved: 12 (10/4/23 to 2024)  Total # of Visits to Date: 10  Progress Note Counter: 2  Progress Note Due Date: 23     Visit Count: Visit count could not be calculated. Make sure you are using a visit which is associated with an episode. OUTPATIENT OCCUPATIONAL THERAPY:Progress Report 11/10/2023  Episode: (OT - CVA)   Appt Desk        Treatment Diagnosis:  Pain in Left Shoulder (M25.512)  Pain in Right Shoulder (M25.511)  Pain in Left Wrist (M25.532)  Other lack of cordination (R27.8)  Hemiplegia and hemiparesis following cerebral infarction affecting right dominant side (L22.260)  Medical/Referring Diagnosis:  Weakness [R53.1]  Referring Physician:  TATIANA Reynolds NP, MD Orders:  OT Eval and Treat   Return MD Appt:    Date of Onset:  Onset Date: 23     Allergies:  Atorvastatin  Restrictions/Precautions:    No data recordedNo data recorded  Medications Last Reviewed:  11/10/2023     SUBJECTIVE   History of Injury/Illness (Reason for Referral): As per 23 hospitalist d/c summary:  Desiree Shah is a 59 y.o. male with PMH of recent CVA, HTN and tobacco use disorder who presented to the ED on 23 for cc worsening slurred speech and instablity with ambulation. Last known normal around 9am prior to symptoms. Hx of of left pontine CVA about six weeks ago at [facility] and was treated on DAPT but could not tolerate statins or zetia. Most recent MRI shows a new 6mm R alpesh infarct per chart.    As per initial evaluation on 23:  Patient sustained a L pontine infarct on the . This was his 2nd stroke

## 2023-11-10 NOTE — PROGRESS NOTES
Bella Leigh  : 1960  Primary: [employer]  Secondary: [address] [facility]  Bailey Yamel [facility] [address]  Phone: [phone]  Fax: [phone] Plan Frequency: 2x per week    Plan of Care/Certification Expiration Date: 24      >PT Visit Info:  Plan Frequency: 2x per week  Plan of Care/Certification Expiration Date: 24  Total # of Visits Approved: 12 (new auth)  Total # of Visits to Date: 2  Progress Note Counter: 1  No Show: 2  Canceled Appointment: 1      Visit Count (total): 9   OUTPATIENT PHYSICAL THERAPY:OP NOTE TYPE: Treatment Note 11/10/2023       Episode  Appt Desk             Treatment Diagnoses:    Generalized Muscle Weakness (M62.81)  Difficulty in walking, Not elsewhere classified (R26.2)  Other abnormalities of gait and mobility (R26.89)  Hemiplegia and hemiparesis following cerebral infarction affecting right dominant side (I69.351)  Hemiplegia and hemiparesis following cerebral infarction affecting left non-dominant side (G85.476)  Medical/Referring Diagnosis:   Weakness [R53.1]  Referring Physician: TATIANA Rabago NP, MD Orders: PT Eval and Treat  Date of Onset: No data recorded   Allergies: Atorvastatin  Restrictions/Precautions:  No data recordedNo data recorded     Interventions Planned (Treatment may consist of any combination of the following):    Current Treatment Recommendations: Strengthening; ROM; Balance training; Functional mobility training; Transfer training; ADL/Self-care training; Endurance training; Gait training; Stair training; Neuromuscular re-education; Manual; Pain management; Home exercise program; Return to work related activity; Safety education & training; Patient/Caregiver education & training; Equipment evaluation, education, & procurement; Modalities; Positioning; Dry needling;  Therapeutic activities       >Subjective Comments: Patient says

## 2023-11-15 ENCOUNTER — HOSPITAL ENCOUNTER (OUTPATIENT)
Dept: PHYSICAL THERAPY | Age: 63
Setting detail: RECURRING SERIES
End: 2023-11-15
Payer: COMMERCIAL

## 2023-11-15 NOTE — PROGRESS NOTES
Ricki Thao  : 1960  Primary: [employer]  Secondary: Lisette Padron [address] [place] [address] @ [address]  [address] [facility] [address] [facility] [address]  Phone: [phone]  Fax: [phone] Plan Frequency: 2x per week    Plan of Care/Certification Expiration Date: 24      PT Visit Info: Total # of Visits Approved: 12 (new auth)  Total # of Visits to Date: 3  Progress Note Counter: 2  No Show: 3  Canceled Appointment: 2 (out of town)         [address] [facility] 11/15/2023     Appt Desk   Episode   MyChart      Mr. Malcom Rodgers was a same-day cancellation for today's appointment.      Cancellation Number: 2 (out of town)       Sarika Murphy, [place]    Future Appointments   Date Time Provider [address]   11/15/2023 11:00 AM Antwan Walsh, [facility]   2023  1:00 PM Dorise Nageotte D, PT [facility]   2023  8:45 AM Angelica Dougherty [facility]   2023  9:30 AM Dorise Nageotte D, PT [facility]   2023 10:15 AM Antwan Walsh, [facility]   2023  1:00 PM Angelica Dougherty [facility]   2023  1:45 PM Dorise Nageotte D, PT [facility]   2023  2:30 PM Lorrine Jillian, OT [facility]   2023  1:00 PM Lormai Walsh, [facility]   2023  1:45 PM Dorise Nageotte D, PT [facility]   2023  2:30 PM Angelica Dougherty [facility]   2023  1:00 PM Angelica Dougherty [facility]   2023  1:45 PM Lorrine Buggy, OT [facility] D   2023  2:30 PM Dorise Nageotte D, PT [facility] D   2023  1:00 PM Angelica Dougherty, SLP [facility]   2023  1:45 PM Lorrine Buggy, OT [facility] D   2023  2:30 PM Dorise Nageotte D, PT [facility]   2023  1:00 PM Angelica Dougherty, SLP [facility]   2023  1:45 PM Lorrine Buggy, OT [facility] D   2023  2:30 PM Dutch Marin, PT [facility] D   12/15/2023  1:00 PM Angelica Dougherty, SLP [facility]   12/15/2023  1:45 PM Lorrine Buggy,

## 2023-11-15 NOTE — PROGRESS NOTES
[facility]  : 1960  Primary: [employer]  Secondary: Jason Cleveland [address] [place] [address] @ [address]  [address] [facility] [address] [facility] [address]  Phone: [phone]  Fax: [phone] Plan Frequency: 2x per week    Plan of Care/Certification Expiration Date: 24      OT Visit Info: Total # of Visits Approved: 12 (10/4/23 to 2024)  Total # of Visits to Date: 3  Progress Note Counter: 2  Progress Note Due Date: 23      OUTPATIENT OCCUPATIONAL THERAPY 11/15/2023     Appt Desk   Episode   MyChart      Mr. Dontae Mckenna was a same-day cancellation for today's appointment.      Cancellation Number:   1    Linard Pill, OT    Future Appointments   Date Time Provider [address]  [facility]   2023  1:00 PM Tabatha May, PT [facility]   2023  8:45 AM [facility]   2023  9:30 AM Marcus RADER, PT [facility]   2023 10:15 AM Linard Pill, OT [facility] SFD   2023  1:00 PM Ed Cintron [facility]FD   2023  1:45 PM Marcus RADER, PT [facility]   2023  2:30 PM Linard Pill, OT [facility] SFD   2023  1:00 PM Linard Pill, OT [facility] SFD   2023  1:45 PM Marcus RADER, PT [facility] SFD   2023  2:30 PM Ed Cintron [facility] SFD   2023  1:00 PM Ed Cintron [facility] SFD   2023  1:45 PM Linard Pill, OT [facility]FD   2023  2:30 PM Marcus RADER, PT [facility]FD   2023  1:00 PM Ed Cintron, SLP [facility] SFD   2023  1:45 PM Linard Pill, OT [facility]FD   2023  2:30 PM Marcus RADER, PT [facility]FD   2023  1:00 PM Ed Cintron, SLP [facility] SFD   2023  1:45 PM Linard Pill, OT [facility]FD   2023  2:30 PM Marcus RADER, PT [facility] SFD   12/15/2023  1:00 PM Ed Cintron, SLP [facility] SFD   12/15/2023  1:45 PM Linard Pill, OT [facility] SFD   12/15/2023  2:30 PM Giuseppe Loza, ALFRED [facility]

## 2023-11-21 ENCOUNTER — HOSPITAL ENCOUNTER (OUTPATIENT)
Dept: PHYSICAL THERAPY | Age: 63
Setting detail: RECURRING SERIES
End: 2023-11-21
Payer: COMMERCIAL

## 2023-11-21 NOTE — PROGRESS NOTES
S[facility]PT D   12/11/2023  2:30 PM Divya RADER, PT [facility] D   12/15/2023  1:00 PM SMITA Hoover [facility]   12/15/2023  1:45 PM Naomi Quiñonez, [facility]   12/15/2023  2:30 PM [facility]   12/18/2023  1:00 PM SMITA Hoover [facility]   12/18/2023  1:45 PM [facility]   12/20/2023  1:00 PM SMITA Hoover [facility]

## 2023-11-27 ENCOUNTER — APPOINTMENT (OUTPATIENT)
Dept: PHYSICAL THERAPY | Age: 63
End: 2023-11-27
Payer: COMMERCIAL

## 2024-02-13 NOTE — THERAPY EVALUATION
Henry Jarrett  : 1960  Primary: [facility] Medicaid  Secondary:  [facility] @ [place] [address]  [facility] [address]  Phone: [phone]  Fax: [phone]    Visit Info:    Effective Dates  2024 TO 2024   Frequency/Duration    1 time(s) a week for 30-60 days   SPEECH LANGUAGE PATHOLOGY: COMMUNICATION    Re-evaluation 2024     Appt Desk   Episode        Treatment Diagnosis:    Dysarthria and anarthria  Cognitive communication deficit  Medical/Referring Diagnosis:  Weakness [R53.1]  Referring Physician:  Jennifer Garvin FNP MD Orders:  Eval and Treat   Return MD Appt:  Unknown  Date of Onset:  23  Allergies:  Atorvastatin  Medications Last Reviewed:  2024     SUBJECTIVE    History of Injury/Illness (Reason for Referral):  Patient known to this clinic, requesting referral back for therapy with c/o struggling with his right side. Patient with continued c/o speech changes and noticing some memory difficulties, though he feels that have improved some in the last couple months. Reports forgetting what he walked into a room for, forgetting what he went into the store to buy, and difficulty remembering what he was doing, if wife talks/requests something of him while he is engaged in another task. Pt states he is highly motivated to continue to make improvements. 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     Previous hx: Pt with acute CVA 23 MRI shows new 6 mm R alpesh infarct. Prior to this, Pt with CVA affecting R side 2023. L pontine stroke. States on 23 he was staggering, change in vision, and had slurred speech. He reports a previous stroke about a year ago and he was able to recover from the first in 3-4 weeks.   Patient Stated Goal(s):  \"I want to improve any way I can\"    Past Medical History/Comorbidities:   Mr. Jarrett  has no past medical history

## 2024-02-14 ENCOUNTER — HOSPITAL ENCOUNTER (OUTPATIENT)
Dept: PHYSICAL THERAPY | Age: 64
Setting detail: RECURRING SERIES
Discharge: HOME | End: 2024-02-17
Payer: MEDICAID

## 2024-02-14 DIAGNOSIS — M25.512 LEFT SHOULDER PAIN, UNSPECIFIED CHRONICITY: Primary | ICD-10-CM

## 2024-02-14 DIAGNOSIS — M79.645 CHRONIC PAIN OF LEFT THUMB: ICD-10-CM

## 2024-02-14 DIAGNOSIS — R47.1 DYSARTHRIA AND ANARTHRIA: Primary | ICD-10-CM

## 2024-02-14 DIAGNOSIS — I69.351 HEMIPARESIS AFFECTING RIGHT SIDE AS LATE EFFECT OF CEREBROVASCULAR ACCIDENT (CVA) (HCC): ICD-10-CM

## 2024-02-14 DIAGNOSIS — M62.81 MUSCLE WEAKNESS (GENERALIZED): Primary | ICD-10-CM

## 2024-02-14 DIAGNOSIS — R41.841 COGNITIVE COMMUNICATION DEFICIT: ICD-10-CM

## 2024-02-14 DIAGNOSIS — R26.81 UNSTEADINESS ON FEET: ICD-10-CM

## 2024-02-14 DIAGNOSIS — G89.29 CHRONIC PAIN OF LEFT THUMB: ICD-10-CM

## 2024-02-14 PROCEDURE — 92523 SPEECH SOUND LANG COMPREHEN: CPT

## 2024-02-14 PROCEDURE — 97530 THERAPEUTIC ACTIVITIES: CPT

## 2024-02-14 PROCEDURE — 97161 PT EVAL LOW COMPLEX 20 MIN: CPT

## 2024-02-14 PROCEDURE — 97110 THERAPEUTIC EXERCISES: CPT

## 2024-02-14 PROCEDURE — 97165 OT EVAL LOW COMPLEX 30 MIN: CPT

## 2024-02-14 NOTE — PROGRESS NOTES
Henyr Jarrett  : 1960  Primary: [facility] Medicaid (Medicaid Managed)  Secondary:  [facility] @ [place] [address]  [facility] [address]  Phone: [phone]  Fax: [phone]    Plan of Care/Certification Expiration Date:  Certification Period Expiration Date: 24      Frequency/Duration:   Plan Frequency: 1-2 times/week      Time In/Out:   Time In: 0845  Time Out: 930    OT Visit Info:  Plan Frequency: 1-2 times/week  Progress Note Due Date: 23  Total # of Visits Approved: 12 (10/4/23 to 2024)  Total # of Visits to Date: 3  Progress Note Counter: 2       Visit Count: Visit count could not be calculated. Make sure you are using a visit which is associated with an episode.   OUTPATIENT OCCUPATIONAL THERAPY: Treatment Note 2024  Episode: (OT: CVA)         Treatment Diagnosis:    Left shoulder pain, unspecified chronicity  Chronic pain of left thumb  Hemiparesis affecting right side as late effect of cerebrovascular accident (CVA) ([facility])  Medical/Referring Diagnosis:   Cerebrovascular disease, unspecified   Referring Physician:  Jennifer Garvin FNP MD Orders:  OT Eval and Treat   Return MD Appt:     Date of Onset:  Onset Date: 23  Allergies:  Atorvastatin  Restrictions/Precautions:   None      Medications Last Reviewed:  2024     Interventions Planned (Treatment may consist of any combination of the following):  Current Treatment Recommendations: Strengthening; ROM; Neuromuscular re-education; Pain management; Safety education & training; Equipment evaluation, education, & procurement; Modalities; Positioning; Dry needling; Self-Care / ADL; Manual Therapy:  STM; Manual Therapy:  Jt Manip; Coordination training; Other (comment) (orthotic management and training)    See Assessment Note      Subjective Comments:   Patient states he can tell how weak his arms have gotten.   >Initial Pain Level:     0/10  >Post Session Pain

## 2024-02-14 NOTE — PROGRESS NOTES
Henry Jarrett  : 1960  Primary: [facility] Medicaid (Medicaid Managed)  Secondary:  [facility] @ [place] [address]   [facility] [address]  Phone: [phone]  Fax: [phone] Plan Frequency: 2x per week    Plan of Care/Certification Expiration Date: 24        Plan of Care/Certification Expiration Date:  Plan of Care/Certification Expiration Date: 24    Frequency/Duration:   Plan Frequency: 2x per week      Time In/Out:   Time In: 0800  Time Out: 0832      PT Visit Info:    Total # of Visits Approved: 99  Progress Note Due Date: 03/15/24  Total # of Visits to Date: 1  Progress Note Counter: 1      Visit Count:  1    OUTPATIENT PHYSICAL THERAPY:   Treatment Note 2024       Episode  (PT - weakness)               Treatment Diagnosis:    Muscle weakness (generalized)  Unsteadiness on feet  Medical/Referring Diagnosis:    Cerebrovascular disease, unspecified    Referring Physician:  Jennifer Garvin FNP MD Orders:  PT Eval and Treat   Return MD Appt:  unknown   Date of Onset:  No data recorded   Allergies:   Atorvastatin  Restrictions/Precautions:   none      Interventions Planned (Treatment may consist of any combination of the following):     See Assessment Note    Subjective Comments:   See assessment  Initial Pain Level::     0/10  Post Session Pain Level:       0/10  Medications Last Reviewed:  2024  Updated Objective Findings:  See Evaluation Note from today  Treatment   THERAPEUTIC ACTIVITY: (10 minutes):    Therapeutic activities with education  improve understanding of POC, need for daily exercise to maintain strength, balance, and coordination.  Required min verbal cues to for understanding .    Treatment/Session Summary:    Treatment Assessment:   See assessment  Communication/Consultation:  Therapy Evaluation sent to referring provider  Equipment provided today:  None  Recommendations/Intent for next treatment session: Next visit

## 2024-02-14 NOTE — THERAPY EVALUATION
external/internal rotation: Occiput/lumbar spine per behind the back  STRENGTH:  Strength RUE  R Shoulder Flexion: 5/5  R Shoulder External Rotation: 5/5  R Elbow Flexion: 5/5  R Wrist Extension: 5/5  Strength LUE  L Shoulder Flexion: 4/5  L Shoulder External Rotation: 4/5  L Elbow Flexion: 5/5  L Elbow Extension: 5/5  L Wrist Extension: 5/5   HAND STRENGTH:  L Fingers  Schumacher Interossei 1: 4+/5  Schumacher Interossei 2: 4+/5  Schumacher Interossei 3: 4+/5   HAND ASSESSMENT:  Left Hand Strength -  (lbs)  Handle Setting 2: 50  Right Hand Strength -  (lbs)  Handle Setting 2: 40  Fine Motor Skills  Other Assessment: Scopely pick-up test: R: 15 seconds; L: 15 seconds   ASSESSMENT   Assessment:  Henry Jarrett returns to outpatient occupational therapy after a 2 month hiatus and presents with decreased bilateral  strength, L shoulder and L thumb pain limiting his overall independence with ADL.  He would benefit from outpatient occupational therapy for B UE remediation, orthotic management and training, manual therapy as warranted, ROM/strengthening and modalities as warranted to maximize his overall independence with ADL, instrumental ADL and work tasks.  Problem List (Impacting functional limitations):  Performance deficits / Impairments: Decreased ADL status; Decreased ROM; Decreased strength; Decreased sensation; Decreased high-level IADLs; Decreased fine motor control; Decreased coordination; Decreased posture; Decreased balance; Decreased functional mobility        Therapy Prognosis:   Prognosis: Good       Assessment Complexity:      PLAN   Effective Dates: 2/14/2024  TO Certification Period Expiration Date: 05/14/24   .   Frequency/Duration: Plan Frequency: 1-2 times/week     Interventions Planned: (Treatment may consist of any combination of the following)  Current Treatment Recommendations: Strengthening; ROM; Neuromuscular re-education; Pain management; Safety education & training; Equipment evaluation,

## 2024-02-14 NOTE — THERAPY EVALUATION
Henry Jarrett  : 1960  Primary: [facility] Medicaid (Medicaid Managed)  Secondary:  [facility] @ [place] [address]  [facility] [address]  Phone: [phone]  Fax: [phone] Plan Frequency: 2x per week    Plan of Care/Certification Expiration Date: 24        Plan of Care/Certification Expiration Date:  Plan of Care/Certification Expiration Date: 24    Frequency/Duration: Plan Frequency: 2x per week      Time In/Out:   Time In: 0800  Time Out: 0832      PT Visit Info:    Total # of Visits Approved: 99  Progress Note Due Date: 03/15/24  Total # of Visits to Date: 1  Progress Note Counter: 1      Visit Count:  1                OUTPATIENT PHYSICAL THERAPY:             Initial Assessment 2024               Episode (PT - weakness)         Treatment Diagnosis:     Muscle weakness (generalized)  Unsteadiness on feet  Medical/Referring Diagnosis:    Cerebrovascular disease, unspecified    Referring Physician:  Jennifer Garvin FNP MD Orders:  PT Eval and Treat   Return MD Appt:  unknown  Date of Onset:    2023  Allergies:  Atorvastatin  Restrictions/Precautions:    None      Medications Last Reviewed:  2024     SUBJECTIVE   History of Injury/Illness (Reason for Referral):  Mr. Jarrett is a 64 right handed black male who presents to outpatient PT for continued weakness s/p CVA 2023 affecting his R side. This was his 2nd stroke and that he was able to recover from the first one in 3-4 weeks. He denies any history of falls. Pt was seen in OP until 2023. Pt states he has not really been doing any exercises that were given but that things are going ok getting a little stronger.  . 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    Patient Stated Goal(s):  \"I just want the strength back in legs and arms and hopefully pain in hand\"  Initial Pain Level:      0/10   Post Session Pain Level:     0/10  Past Medical

## 2024-02-23 ENCOUNTER — APPOINTMENT (OUTPATIENT)
Dept: PHYSICAL THERAPY | Age: 64
End: 2024-02-23
Payer: MEDICAID

## 2024-02-28 ENCOUNTER — HOSPITAL ENCOUNTER (OUTPATIENT)
Dept: PHYSICAL THERAPY | Age: 64
Setting detail: RECURRING SERIES
End: 2024-02-28
Payer: MEDICAID

## 2024-02-28 NOTE — PROGRESS NOTES
Henry Jarrett  : 1960  Primary: [facility] Medicaid  Secondary:  [facility] @ [place] [address]  [place] [address]  Phone: [phone]  Fax: [phone]    SLP Visit Info:  No Show: 0  Canceled Appointment: 1 (24 at ER with daughter)  Total # of Visits to Date: 0      OUTPATIENT THERAPY: 2024  Episode  Appt Desk        Henry Jarrett cancelled his appointment for today due to  at ER with daughter .  Will plan to follow up next during next appointment.  Thank you,  Ida Mcghee, SLP    Future Appointments   Date Time Provider Department Center   2024  2:30 PM Karina Adams, PTA [facility]   2024  3:15 PM Gwyn Galloway, OT [facility]   3/6/2024  1:00 PM Ida Mcghee SLP [facility]   3/6/2024  1:45 PM Enedelia Porter, PT [facility]   3/6/2024  2:30 PM Gwyn Galloway, OT [facility]   3/11/2024  1:45 PM Ida Mcghee, SLP [facility]   3/11/2024  2:30 PM Gwyn Galloway, OT [facility]   3/11/2024  3:15 PM Enedelia Porter, PT [facility]

## 2024-02-28 NOTE — PROGRESS NOTES
Henry Jarrett  : 1960  Primary: [facility] Medicaid  Secondary:  [facility] @ [place] [address]  [facility] [address]  Phone: [phone]  Fax: [phone] Plan Frequency: 2x per week    Plan of Care/Certification Expiration Date: 24      OT Visit Info:  Total # of Visits Approved: 12 (10/4/23 to 2024)  Total # of Visits to Date: 4  Progress Note Counter: 3  Progress Note Due Date: 23      OUTPATIENT OCCUPATIONAL THERAPY 2024     Appt Desk   Episode   MyChart      Mr. Jarrett was a same-day cancellation for today's appointment due to family emergency.     Cancellation Number:   1    Gwyn Galloway OT    Future Appointments   Date Time Provider Department Center   2024  2:30 PM Karina Adams, PTA [facility]   2024  3:15 PM Gwyn Galloway, OT [facility]   3/6/2024  1:00 PM Ida Mcghee, SLP [facility]   3/6/2024  1:45 PM Enedelia Porter, PT [facility]   3/6/2024  2:30 PM Gwyn Galloway, OT [facility]   3/11/2024  1:45 PM Ida Mcghee, SLP [facility]   3/11/2024  2:30 PM Gwyn Galloway, OT [facility]   3/11/2024  3:15 PM Enedelia Porter, PT [facility]

## 2024-02-28 NOTE — PROGRESS NOTES
Henry Jarrett  : 1960  Primary: [facility] Medicaid  Secondary:  [facility] @ [place] [address]  [place] [address]  Phone: [phone]  Fax: [phone] Plan Frequency: 2x per week    Plan of Care/Certification Expiration Date: 24      PT Visit Info:  Total # of Visits Approved: 99  Total # of Visits to Date: 1  Progress Note Counter: 1  Progress Note Due Date: 03/15/24  No Show: 5  Canceled Appointment: 3 (with family in ER)         OUTPATIENT PHYSICAL THERAPY 2024     Appt Desk   Episode   MyChart      Mr. Jarrett was a same-day cancellation for today's appointment.     Cancellation Number: 3 (with family in ER)       Karina Adams PTA    Future Appointments   Date Time Provider Department Center   2024  2:30 PM Karina Adams PTA [facility]   2024  3:15 PM Gwyn Galloway, OT [facility]   3/6/2024  1:00 PM Ida Mcghee, SLP [facility]   3/6/2024  1:45 PM Enedelia Porter, PT [facility]   3/6/2024  2:30 PM Gwyn Galloway, OT [facility]   3/11/2024  1:45 PM Ida Mcghee, SLP [facility]   3/11/2024  2:30 PM Gwyn Galloway, OT [facility]   3/11/2024  3:15 PM Enedelia Porter, PT [facility]

## 2024-03-06 ENCOUNTER — HOSPITAL ENCOUNTER (OUTPATIENT)
Dept: PHYSICAL THERAPY | Age: 64
Setting detail: RECURRING SERIES
Discharge: HOME | End: 2024-03-09
Payer: MEDICAID

## 2024-03-06 PROCEDURE — 97129 THER IVNTJ 1ST 15 MIN: CPT

## 2024-03-06 PROCEDURE — 97130 THER IVNTJ EA ADDL 15 MIN: CPT

## 2024-03-06 PROCEDURE — 97110 THERAPEUTIC EXERCISES: CPT

## 2024-03-06 ASSESSMENT — PAIN SCALES - GENERAL: PAINLEVEL_OUTOF10: 0

## 2024-03-06 NOTE — PROGRESS NOTES
through the use of increased articulatory precision and speech prosody for effective communication.   Patient will utilize a memory management system within the home environment with min amount of support for improved day-to-day continuity and increased self-reliance.     Updated Objective Findings:  None Today  Treatment   Reviewed dysarthria strategies. 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  Speech intelligibility at conversation level: 95% accuracy   Using strategies of loud volume, over-articulate, pause and breathe with min cues    Cognitive-linguistic  Discussed breathing exercises to aid when pt is experiencing frustration, such as when there are disgruntled fans at the games  Provided instruction on compensatory strategies of self talk for attention, goal-plan-do-review  Patient completed scenarios for tasks at home for G-P-D-R with mod assistance from clinician  Patient able to use self talk with task with 70% efficiency given mod cues    HEP: self talk, goal-plan-do-review tasks to complete     Treatment/Session Summary:    Communication/Consultation:  None today  Recommendations/Intent for next treatment session: Next visit will focus on goals.    Total Duration:  Time In: 1300  Time Out: 1342  Minutes: 42    Ida Mcghee, SLP

## 2024-03-06 NOTE — PROGRESS NOTES
Henry Jarrett  : 1960  Primary: [facility] Medicaid (Medicaid Managed)  Secondary:  [facility] @ [place] [address]   [facility] [address]  Phone: [phone]  Fax: [phone] Plan Frequency: 2x per week    Plan of Care/Certification Expiration Date: 24        Plan of Care/Certification Expiration Date:  Plan of Care/Certification Expiration Date: 24    Frequency/Duration:   Plan Frequency: 2x per week      Time In/Out:   Time In: 1345  Time Out: 1425      PT Visit Info:    Total # of Visits Approved: 99  Progress Note Due Date: 03/15/24  Total # of Visits to Date: 2  Progress Note Counter: 2  Canceled Appointment: 3 (with family in ER)      Visit Count:  2    OUTPATIENT PHYSICAL THERAPY:   Treatment Note 3/6/2024       Episode  (PT - weakness)               Treatment Diagnosis:    Muscle weakness (generalized)  Unsteadiness on feet  Medical/Referring Diagnosis:    Cerebrovascular disease, unspecified    Referring Physician:  Jennifer Garvin FNP MD Orders:  PT Eval and Treat   Return MD Appt:  unknown   Date of Onset:  No data recorded   Allergies:   Atorvastatin  Restrictions/Precautions:   none      Interventions Planned (Treatment may consist of any combination of the following):     See Assessment Note    Subjective Comments: I had to take my dtr to the ER for her sickle cell.  It was so bad.  And now my wife had to pput off her suergery because my wife had covid. I have access to [facility].  I just never go.      Initial Pain Level::    5  /10 lower back right hand is sore ans swoll.  Off and on for long time.    Post Session Pain Level:        /10  Medications Last Reviewed:  3/6/2024  Updated Objective Findings:  See Evaluation Note from today  Treatment   THERAPEUTIC ACTIVITY: (0 minutes):    Therapeutic activities with education  improve understanding of POC, need for daily exercise to maintain strength, balance, and coordination.  Required

## 2024-03-06 NOTE — PROGRESS NOTES
3/11/2024  1:45 PM Ida Mcghee, SLP [facility]   3/11/2024  2:30 PM Gwyn Galloway, OT [facility]   3/11/2024  3:15 PM Enedelia Porter, PT [facility]   3/27/2024  1:45 PM Gwyn Galloway, OT [facility]   3/27/2024  2:30 PM Karina Adams, PTA [facility]   3/27/2024  3:15 PM Ida Mcghee, SLP [facility]   4/3/2024  1:45 PM Enedelia Porter, PT [facility]   4/3/2024  2:30 PM Ida Mcghee, SLP [facility]   4/8/2024  2:30 PM Enedelia Porter, PT [facility]   4/8/2024  3:15 PM Gwyn Galloway, OT [facility]

## 2024-03-11 ENCOUNTER — HOSPITAL ENCOUNTER (OUTPATIENT)
Dept: PHYSICAL THERAPY | Age: 64
Setting detail: RECURRING SERIES
End: 2024-03-11
Payer: MEDICAID

## 2024-03-11 NOTE — PROGRESS NOTES
Henry Jarrett  : 1960  Primary: [facility] Medicaid  Secondary:  [facility] @ [place] [address]  [place] [address]  Phone: [phone]  Fax: [phone] Plan Frequency: 2x per week    Plan of Care/Certification Expiration Date: 24      PT Visit Info:  Total # of Visits Approved: 99  Total # of Visits to Date: 2  Progress Note Counter: 2  Progress Note Due Date: 03/15/24  No Show: 5  Canceled Appointment: 4         OUTPATIENT PHYSICAL THERAPY 3/11/2024     Appt Desk   Episode   MyChart      Mr. Jarrett was a same-day cancellation for today's appointment.     Cancellation Number: 4       Enedelia Porter, PT    Future Appointments   Date Time Provider Department Center   3/27/2024  1:45 PM Gwyn Galloway, OT [facility]   3/27/2024  2:30 PM Karina Adams, PTA [facility]   3/27/2024  3:15 PM Ida Mcghee, SLP [facility]   4/3/2024  1:45 PM Enedelia Porter, PT [facility]   4/3/2024  2:30 PM Ida Mcghee, SLP [facility]   2024  2:30 PM Enedelia Porter, PT [facility]   2024  3:15 PM Gwyn Galloway, OT [facility]

## 2024-03-11 NOTE — PROGRESS NOTES
Henry Jarrett  : 1960  Primary: [facility] Medicaid  Secondary:  [facility] @ [place] [address]  [place] [address]  Phone: [phone]  Fax: [phone] Plan Frequency: 2x per week    Plan of Care/Certification Expiration Date: 24      OT Visit Info:  Total # of Visits Approved: 12 (10/4/23 to 2024)  Total # of Visits to Date: 3  Progress Note Counter: 3  Progress Note Due Date: 23  Canceled Appointment: 1      OUTPATIENT OCCUPATIONAL THERAPY 3/11/2024     Appt Desk   Episode   MyChart      Mr. Jarrett was a same-day cancellation for today's appointment due to \"fever.\"    Cancellation Number: 1     Gwyn Galloway OT    Future Appointments   Date Time Provider Department Center   3/11/2024  3:15 PM Enedelia Porter, PT [facility]   3/27/2024  1:45 PM Gwyn Galloway, OT [facility]   3/27/2024  2:30 PM Karina Adams, PTA [facility]   3/27/2024  3:15 PM Ida Mcghee, SLP [facility]   4/3/2024  1:45 PM Enedelia Porter, PT [facility]   4/3/2024  2:30 PM Ida Mcghee, SLP [facility]   2024  2:30 PM Enedelia Porter, PT [facility]   2024  3:15 PM Gwyn Galloway, OT [facility]

## 2024-03-11 NOTE — PROGRESS NOTES
Henry Jarrett  : 1960  Primary: [facility] Medicaid  Secondary:  [facility] @ [place] [address]  [place] [address]  Phone: [phone]  Fax: [phone]    SLP Visit Info:  No Show: 0  Canceled Appointment: 2 (3/11 fever)  Total # of Visits to Date: 1      OUTPATIENT THERAPY: 3/11/2024  Episode  Appt Desk        Henry Jarrett cancelled his appointment for today due to  fever .  Will plan to follow up next during next appointment.  Thank you,  Ida Mcghee, SLP    Future Appointments   Date Time Provider Department Center   3/11/2024  2:30 PM Gwyn Galloway, OT [facility]   3/11/2024  3:15 PM Enedelia Porter, PT [facility]   3/27/2024  1:45 PM Gwyn Galloway, OT [facility]   3/27/2024  2:30 PM Karina Adams, PTA [facility]   3/27/2024  3:15 PM Ida Mcgehe, SLP [facility]   4/3/2024  1:45 PM Enedelia Porter, PT [facility]   4/3/2024  2:30 PM Ida Mcghee, SLP [facility]   2024  2:30 PM Enedelia Porter, PT [facility]   2024  3:15 PM Gwyn Galloway, OT [facility]

## 2024-03-27 ENCOUNTER — HOSPITAL ENCOUNTER (OUTPATIENT)
Dept: PHYSICAL THERAPY | Age: 64
Setting detail: RECURRING SERIES
Discharge: HOME | End: 2024-03-30
Payer: MEDICAID

## 2024-03-27 PROCEDURE — 97110 THERAPEUTIC EXERCISES: CPT

## 2024-03-27 PROCEDURE — 97129 THER IVNTJ 1ST 15 MIN: CPT

## 2024-03-27 PROCEDURE — 97130 THER IVNTJ EA ADDL 15 MIN: CPT

## 2024-03-27 ASSESSMENT — PAIN SCALES - GENERAL
PAINLEVEL_OUTOF10: 0
PAINLEVEL_OUTOF10: 0

## 2024-03-27 NOTE — PROGRESS NOTES
Henry Jarrett  : 1960  Primary: [facility] Medicaid (Medicaid Managed)  Secondary:  [facility] @ [place] [address]  [facility] [address]  Phone: [phone]  Fax: [phone] Plan Frequency: 2x per week    Plan of Care/Certification Expiration Date: 24        Plan of Care/Certification Expiration Date:  Plan of Care/Certification Expiration Date: 24    Frequency/Duration:   Plan Frequency: 2x per week      Time In/Out:   Time In: 1430  Time Out: 1513      PT Visit Info:    Total # of Visits Approved: 99  Progress Note Due Date: 03/15/24  Total # of Visits to Date: 3  Progress Note Counter: 3  Canceled Appointment: 4      Visit Count:  3    OUTPATIENT PHYSICAL THERAPY:   Treatment Note 3/27/2024       Episode  (PT - weakness)               Treatment Diagnosis:    Muscle weakness (generalized)  Unsteadiness on feet  Medical/Referring Diagnosis:    Cerebrovascular disease, unspecified    Referring Physician:  Jennifer Garvin FNP MD Orders:  PT Eval and Treat   Return MD Appt:  unknown   Date of Onset:  No data recorded   Allergies:   Atorvastatin  Restrictions/Precautions:   none      Interventions Planned (Treatment may consist of any combination of the following):     See Assessment Note    Subjective Comments:   Patient just finished OT session.  BP was up earlier.  BP taken 140/98   HR 73.     No light headedness.     Initial Pain Level::     0/10    Post Session Pain Level:       0/10  Medications Last Reviewed:  3/27/2024  Updated Objective Findings:  None Today  Treatment   THERAPEUTIC ACTIVITY: (0 minutes):    Therapeutic activities with education  improve understanding of POC, need for daily exercise to maintain strength, balance, and coordination.  Required min verbal cues to for understanding.  THERAPEUTIC EXERCISE: (41 minutes):    Exercises per grid below to improve mobility, strength, balance, coordination, and pain relief .  Required

## 2024-03-27 NOTE — PROGRESS NOTES
Session Pain Level:     0/10  Medications Last Reviewed:  3/27/2024  Updated Objective Findings:   BP: 160/97; HR 80  Treatment   THERAPEUTIC EXERCISE: (39 minutes):    Exercises per grid below to improve mobility, strength, balance, and coordination.  Required minimal visual, verbal, manual, and tactile cues to promote proper body alignment, promote proper body posture, and promote proper body mechanics.  Progressed resistance, range, repetitions, and complexity of movement as indicated.   Date:  3/6 Date:  3/27   Activity/Exercise Parameters Parameters   Wall pushups X 10-15 reps    U BE  4 minutes forward/4 minutes backward against 7.0 resistance 4 minutes forward/4 minutes backward against 6.5 lb resistance   Shoulder scaption With swiss ball behind thoracic spine 1-2 sets 15-20 reps    Shoulder extension  With elbows extended green theratubing 1-2 sets 15-20 reps   Elbow extension Matrix machine 1. L UE 2.5 lbs 1-2 sets 10-15 reps. 2. L UE 7.5 lbs 1-2 sets 15-20 reps. 3. R UE 7.5 lbs 1-2 sets 15-20 reps.     Latissimus stretch B UE holding onto overhead bar 1-2 sets held 2-3 minutes    Shoulder external rotation  1-2 sets 15-20 reps against yellow theratubing   Shoulder horizontal abduction adduction With swiss ball behind thoracic spine 1-2 sets 15-20 reps    Digit adduction  Yellow theraputty 1-2 sets 15-20 reps Orange theraputty 1-2 sets 15-20 reps   Digit abduction  Against rubber band resistance index, small finger and thumb perdomo abduction 1-2 sets 10-15 reps Against rubber band resistance index, small finger 1-2 sets 15-20 reps   3 point pinch 2 sets blue and black x 5 reps each        Treatment/Session Summary:    Treatment Assessment:   Henry Jarrett demonstrated improved L shoulder elevation and bilateral  strength (see progress note for details) as needed for ADL.  /97, and patient reported feeling a little \"sluggish.\"  Patient states he plans to follow up with PCP.   Continue OT per plan 
Modalities; Positioning; Dry needling; Self-Care / ADL; Manual Therapy:  STM; Manual Therapy:  Jt Manip; Coordination training; Other (comment) (orthotic management and training)       Goals: (Goals have been discussed and agreed upon with patient.)  Short-Term Functional Goals: Time Frame: 4 weeks  Complete home B UE stretching and strengthening program independently. Partially met. Continue.   Improve bilateral  strength by 10 lbs as needed for gripping ADL. Met  Elevate L shoulders to 170° without complaints of pain as needed for overhead reaching for ADL/instrumental ADL. Met  Discharge Goals: Time Frame: 12 weeks Not met. Continue.  Improve L shoulder strength to 5/5 as needed for ADL, instrumental ADL and work tasks.   Improve bilateral  strength by 30 lbs as needed for ADL/instrumental ADL and work tasks.          Outcome Measure:   Tool Used: Fugl Borrego Upper Extremity Assessment  Score:  Initial: 62/66  Most Recent: XX/66 (Date: )   Interpretation of Score: Higher scores indicate improvements in motor control and strength as needed for ADL/instrumental ADL.     MEDICAL NECESSITY:   > Skilled intervention continues to be required due to R UE hemiparesis and L UE pain and decreased strength affecting independence with ADL/instrumental ADL/work tasks.  REASON FOR SERVICES/OTHER COMMENTS:  > Patient continues to require skilled intervention due to decreased independence with ADL/instrumental ADL/work tasks.  Total Duration:  Time In: 1348  Time Out: 1430    Regarding Henry Jarrett's therapy, I certify that the treatment plan above will be carried out by a therapist or under their direction.  Thank you for this referral,  Gwyn Galloway OT     Referring Physician Signature: Jennifer Garvin FNP No Signature is Required for this note.        Post Session Pain  Charge Capture   POC Link  Treatment Note Link  MD Guidelines  Jacqueline

## 2024-03-27 NOTE — PROGRESS NOTES
Henry Jarrett  : 1960  Primary: [facility] Medicaid  Secondary:  [facility] @ [place] [address]  [facility] [address]  Phone: [phone]  Fax: [phone]    Effective Dates:   2024 TO 2024   Frequency/Duration    1 time(s) a week for 30-60 days  SLP Visit Info:  No Show: 0  Canceled Appointment: 2 (3/11 fever)  Total # of Visits to Date: 2        OUTPATIENT SPEECH PATHOLOGY NOTE:Daily Note 3/27/2024  Appt Desk   Episode        Treatment Diagnosis:    Dysarthria and anarthria  Cognitive communication deficit  Medical/Referring Diagnosis:    Cerebrovascular disease, unspecified   Referring Physician:  Jennifer Garvin FNP MD Orders:  Eval and Treat   Allergies:  Atorvastatin  Medications Last Reviewed:  3/27/2024  Subjective Comments:  Patient states no pertinent updates.  Pain:  Patient does not c/o pain.   Interventions Planned: (Treatment may consist of any combination of the following)        See Assessment Note  GOALS: (Goals have been discussed and agreed upon with patient.)  Short-Term Functional Goals: Time Frame: 4 weeks  Patient will independently use compensatory strategies at conversational level to improve speech intelligibility to >90% accuracy.  Patient will identify and demonstrate three compensatory strategies to improve intelligibility.   Patient will perform home exercise program based on incorporating compensatory strategies into communication with 90% efficiency.   Patient will use external memory aid with 80% accuracy with verbal and tactile cues to maximize memory skills.   Patient will demonstrate recall of functional information (ex: with use of planner/memory journal/to do list) with min cues with 80% accuracy, in order to increase functional integration into environment.  Discharge Goals: Time Frame: 8 weeks  Patient will develop functional and intelligible speech and utilize compensatory strategies through the use

## 2024-04-03 ENCOUNTER — HOSPITAL ENCOUNTER (OUTPATIENT)
Dept: PHYSICAL THERAPY | Age: 64
Setting detail: RECURRING SERIES
End: 2024-04-03
Payer: MEDICAID

## 2024-04-03 NOTE — PROGRESS NOTES
Henry Jarrett  : 1960  Primary: [facility] Medicaid  Secondary:  [facility] @ [place] [address]  [facility] [address]  Phone: [phone]  Fax: [phone] Plan Frequency: 2x per week    Plan of Care/Certification Expiration Date: 24      PT Visit Info:  Total # of Visits Approved: 99  Total # of Visits to Date: 3  Progress Note Counter: 3  Progress Note Due Date: 03/15/24  No Show: 5  Canceled Appointment: 5         OUTPATIENT PHYSICAL THERAPY 4/3/2024     Appt Desk   Episode   MyChart      Mr. Jarrett was a same-day cancellation for today's appointment. Pt had the chance to get in to see his primary for his BP so he took it.      Cancellation Number: 5       Enedelia Porter, PT    Future Appointments   Date Time Provider Department Center   2024  2:30 PM Enedelia Porter, PT [facility]   2024  3:15 PM Gwyn Galloway, OT [facility]   4/10/2024  2:30 PM Karina Adams, PTA [facility]   4/10/2024  3:15 PM Ida Mcghee, SLP [facility]   4/15/2024  1:45 PM Ida Mcghee, SLP [facility]   4/15/2024  2:30 PM Karina Adams, PTA [facility]   4/15/2024  3:15 PM Gwyn Galloway, OT [facility]   2024  1:45 PM Ida Mcghee, SLP [facility]   2024  2:30 PM Gwyn Galloway, OT [facility]   2024  3:15 PM Enedelia Porter, PT [facility]   2024  1:45 PM Ida Mcghee, SLP [facility]   2024  2:30 PM Enedelia Porter, PT [facility]   2024  3:15 PM Gwyn Galloway, OT [facility]   2024  1:45 PM Ida Mcghee, SLP [facility]   2024  2:30 PM Karina Adams, ALFRED [facility] D   2024  3:15 PM Gwyn Galloway, OT [facility] D

## 2024-04-03 NOTE — PROGRESS NOTES
Henry Jarrett  : 1960  Primary: [facility] Medicaid  Secondary:  [facility] @ [place] [address]  [place] [address]  Phone: [phone]  Fax: [phone]    SLP Visit Info:  No Show: 0  Canceled Appointment: 2 (3/11 fever)  Total # of Visits to Date: 2      OUTPATIENT THERAPY: 4/3/2024  Episode  Appt Desk        Henry Jarrett cancelled his appointment for today due to  has to go to doctor for blood pressure .  Will plan to follow up next during next appointment.  Thank you,  SMITA Barlow    Future Appointments   Date Time Provider Department Center   4/3/2024  1:45 PM Enedelia Porter, PT [facility]   4/3/2024  2:30 PM Ida Mcghee, SLP [facility]   2024  2:30 PM Enedelia Porter, PT [facility]   2024  3:15 PM Gwyn Galloway, OT [facility]   4/10/2024  2:30 PM Karina Adams, PTA [facility]   4/10/2024  3:15 PM Ida Mcghee, SLP [facility]   4/15/2024  1:45 PM Ida Mcghee, SLP [facility]   4/15/2024  2:30 PM Karina Adams, PTA [facility]   4/15/2024  3:15 PM Gwyn Galloway, OT [facility]   2024  1:45 PM Ida Mcghee, SLP [facility]   2024  2:30 PM Gwyn Galloway, OT [facility]   2024  3:15 PM Enedelia Porter, PT [facility]   2024  1:45 PM Ida Mcghee, SLP [facility]   2024  2:30 PM Enedelia Porter, PT [facility]   2024  3:15 PM Gwyn Galloway, OT [facility]   2024  1:45 PM Ida Mcghee, SLP [facility] D   2024  2:30 PM Karina Adams PTA [facility] D   2024  3:15 PM Gwyn Galloway, OT [facility]

## 2024-04-05 ENCOUNTER — APPOINTMENT (OUTPATIENT)
Dept: CT IMAGING | Age: 64
DRG: 045 | End: 2024-04-05
Payer: MEDICAID

## 2024-04-05 ENCOUNTER — APPOINTMENT (OUTPATIENT)
Dept: GENERAL RADIOLOGY | Age: 64
DRG: 045 | End: 2024-04-05
Payer: MEDICAID

## 2024-04-05 ENCOUNTER — HOSPITAL ENCOUNTER (INPATIENT)
Age: 64
LOS: 2 days | Discharge: HOME | DRG: 045 | End: 2024-04-07
Attending: GENERAL PRACTICE | Admitting: FAMILY MEDICINE
Payer: MEDICAID

## 2024-04-05 DIAGNOSIS — I63.9 ACUTE CEREBROVASCULAR ACCIDENT (CVA) (HCC): Primary | ICD-10-CM

## 2024-04-05 DIAGNOSIS — I63.9 CEREBROVASCULAR ACCIDENT (CVA), UNSPECIFIED MECHANISM (HCC): ICD-10-CM

## 2024-04-05 PROBLEM — I71.20 THORACIC AORTIC ANEURYSM (HCC): Status: ACTIVE | Noted: 2024-04-05

## 2024-04-05 PROBLEM — M54.2 NECK PAIN: Status: ACTIVE | Noted: 2024-04-05

## 2024-04-05 PROBLEM — R51.9 HEADACHE: Status: ACTIVE | Noted: 2024-04-05

## 2024-04-05 PROBLEM — Z72.0 TOBACCO USE: Status: ACTIVE | Noted: 2024-04-05

## 2024-04-05 LAB
ALBUMIN SERPL-MCNC: 3.8 G/DL (ref 3.2–4.6)
ALBUMIN/GLOB SERPL: 0.9 (ref 0.4–1.6)
ALP SERPL-CCNC: 76 U/L (ref 50–136)
ALT SERPL-CCNC: 29 U/L (ref 12–65)
ANION GAP SERPL CALC-SCNC: 4 MMOL/L (ref 2–11)
AST SERPL-CCNC: 12 U/L (ref 15–37)
BASOPHILS # BLD: 0 K/UL (ref 0–0.2)
BASOPHILS NFR BLD: 0 % (ref 0–2)
BILIRUB SERPL-MCNC: 0.4 MG/DL (ref 0.2–1.1)
BUN SERPL-MCNC: 10 MG/DL (ref 8–23)
CALCIUM SERPL-MCNC: 9.3 MG/DL (ref 8.3–10.4)
CHLORIDE SERPL-SCNC: 110 MMOL/L (ref 103–113)
CO2 SERPL-SCNC: 25 MMOL/L (ref 21–32)
CREAT SERPL-MCNC: 1 MG/DL (ref 0.8–1.5)
DIFFERENTIAL METHOD BLD: ABNORMAL
EKG ATRIAL RATE: 72 BPM
EKG DIAGNOSIS: NORMAL
EKG P AXIS: 40 DEGREES
EKG P-R INTERVAL: 148 MS
EKG Q-T INTERVAL: 410 MS
EKG QRS DURATION: 94 MS
EKG QTC CALCULATION (BAZETT): 448 MS
EKG R AXIS: -54 DEGREES
EKG T AXIS: 43 DEGREES
EKG VENTRICULAR RATE: 72 BPM
EOSINOPHIL # BLD: 0.2 K/UL (ref 0–0.8)
EOSINOPHIL NFR BLD: 3 % (ref 0.5–7.8)
ERYTHROCYTE [DISTWIDTH] IN BLOOD BY AUTOMATED COUNT: 12.8 % (ref 11.9–14.6)
GLOBULIN SER CALC-MCNC: 4.1 G/DL (ref 2.8–4.5)
GLUCOSE SERPL-MCNC: 121 MG/DL (ref 65–100)
HCT VFR BLD AUTO: 41.3 % (ref 41.1–50.3)
HGB BLD-MCNC: 14 G/DL (ref 13.6–17.2)
IMM GRANULOCYTES # BLD AUTO: 0 K/UL (ref 0–0.5)
IMM GRANULOCYTES NFR BLD AUTO: 0 % (ref 0–5)
INR PPP: 0.9
LYMPHOCYTES # BLD: 2.7 K/UL (ref 0.5–4.6)
LYMPHOCYTES NFR BLD: 45 % (ref 13–44)
MCH RBC QN AUTO: 33.4 PG (ref 26.1–32.9)
MCHC RBC AUTO-ENTMCNC: 33.9 G/DL (ref 31.4–35)
MCV RBC AUTO: 98.6 FL (ref 82–102)
MONOCYTES # BLD: 0.6 K/UL (ref 0.1–1.3)
MONOCYTES NFR BLD: 10 % (ref 4–12)
NEUTS SEG # BLD: 2.6 K/UL (ref 1.7–8.2)
NEUTS SEG NFR BLD: 42 % (ref 43–78)
NRBC # BLD: 0 K/UL (ref 0–0.2)
PLATELET # BLD AUTO: 259 K/UL (ref 150–450)
PMV BLD AUTO: 11 FL (ref 9.4–12.3)
POTASSIUM SERPL-SCNC: 4.1 MMOL/L (ref 3.5–5.1)
PROT SERPL-MCNC: 7.9 G/DL (ref 6.3–8.2)
PROTHROMBIN TIME: 13.1 SEC (ref 11.3–14.9)
RBC # BLD AUTO: 4.19 M/UL (ref 4.23–5.6)
SODIUM SERPL-SCNC: 139 MMOL/L (ref 136–146)
WBC # BLD AUTO: 6.1 K/UL (ref 4.3–11.1)

## 2024-04-05 PROCEDURE — 6370000000 HC RX 637 (ALT 250 FOR IP): Performed by: INTERNAL MEDICINE

## 2024-04-05 PROCEDURE — 93010 ELECTROCARDIOGRAM REPORT: CPT | Performed by: INTERNAL MEDICINE

## 2024-04-05 PROCEDURE — 71045 X-RAY EXAM CHEST 1 VIEW: CPT

## 2024-04-05 PROCEDURE — 99285 EMERGENCY DEPT VISIT HI MDM: CPT

## 2024-04-05 PROCEDURE — 70498 CT ANGIOGRAPHY NECK: CPT

## 2024-04-05 PROCEDURE — 85610 PROTHROMBIN TIME: CPT

## 2024-04-05 PROCEDURE — 6360000004 HC RX CONTRAST MEDICATION: Performed by: GENERAL PRACTICE

## 2024-04-05 PROCEDURE — 93005 ELECTROCARDIOGRAM TRACING: CPT | Performed by: GENERAL PRACTICE

## 2024-04-05 PROCEDURE — 70450 CT HEAD/BRAIN W/O DYE: CPT

## 2024-04-05 PROCEDURE — 85025 COMPLETE CBC W/AUTO DIFF WBC: CPT

## 2024-04-05 PROCEDURE — 2580000003 HC RX 258: Performed by: INTERNAL MEDICINE

## 2024-04-05 PROCEDURE — 80053 COMPREHEN METABOLIC PANEL: CPT

## 2024-04-05 PROCEDURE — 1100000003 HC PRIVATE W/ TELEMETRY

## 2024-04-05 RX ORDER — SODIUM CHLORIDE 9 MG/ML
INJECTION, SOLUTION INTRAVENOUS PRN
Status: DISCONTINUED | OUTPATIENT
Start: 2024-04-05 | End: 2024-04-07 | Stop reason: HOSPADM

## 2024-04-05 RX ORDER — ENOXAPARIN SODIUM 100 MG/ML
40 INJECTION SUBCUTANEOUS DAILY
Status: DISCONTINUED | OUTPATIENT
Start: 2024-04-06 | End: 2024-04-07 | Stop reason: HOSPADM

## 2024-04-05 RX ORDER — NICOTINE 21 MG/24HR
1 PATCH, TRANSDERMAL 24 HOURS TRANSDERMAL DAILY PRN
Status: DISCONTINUED | OUTPATIENT
Start: 2024-04-05 | End: 2024-04-07 | Stop reason: HOSPADM

## 2024-04-05 RX ORDER — SODIUM CHLORIDE 0.9 % (FLUSH) 0.9 %
5-40 SYRINGE (ML) INJECTION PRN
Status: DISCONTINUED | OUTPATIENT
Start: 2024-04-05 | End: 2024-04-07 | Stop reason: HOSPADM

## 2024-04-05 RX ORDER — POLYETHYLENE GLYCOL 3350 17 G/17G
17 POWDER, FOR SOLUTION ORAL DAILY PRN
Status: DISCONTINUED | OUTPATIENT
Start: 2024-04-05 | End: 2024-04-07 | Stop reason: HOSPADM

## 2024-04-05 RX ORDER — SODIUM CHLORIDE 0.9 % (FLUSH) 0.9 %
5-40 SYRINGE (ML) INJECTION EVERY 12 HOURS SCHEDULED
Status: DISCONTINUED | OUTPATIENT
Start: 2024-04-05 | End: 2024-04-07 | Stop reason: HOSPADM

## 2024-04-05 RX ORDER — ONDANSETRON 4 MG/1
4 TABLET, ORALLY DISINTEGRATING ORAL EVERY 8 HOURS PRN
Status: DISCONTINUED | OUTPATIENT
Start: 2024-04-05 | End: 2024-04-07 | Stop reason: HOSPADM

## 2024-04-05 RX ORDER — ACETAMINOPHEN 650 MG/1
650 SUPPOSITORY RECTAL EVERY 6 HOURS PRN
Status: DISCONTINUED | OUTPATIENT
Start: 2024-04-05 | End: 2024-04-07 | Stop reason: HOSPADM

## 2024-04-05 RX ORDER — ASPIRIN 300 MG/1
300 SUPPOSITORY RECTAL DAILY
Status: DISCONTINUED | OUTPATIENT
Start: 2024-04-06 | End: 2024-04-07 | Stop reason: HOSPADM

## 2024-04-05 RX ORDER — BISACODYL 10 MG
10 SUPPOSITORY, RECTAL RECTAL DAILY PRN
Status: DISCONTINUED | OUTPATIENT
Start: 2024-04-05 | End: 2024-04-07 | Stop reason: HOSPADM

## 2024-04-05 RX ORDER — ACETAMINOPHEN 325 MG/1
650 TABLET ORAL EVERY 6 HOURS PRN
Status: DISCONTINUED | OUTPATIENT
Start: 2024-04-05 | End: 2024-04-07 | Stop reason: HOSPADM

## 2024-04-05 RX ORDER — ONDANSETRON 2 MG/ML
4 INJECTION INTRAMUSCULAR; INTRAVENOUS EVERY 6 HOURS PRN
Status: DISCONTINUED | OUTPATIENT
Start: 2024-04-05 | End: 2024-04-07 | Stop reason: HOSPADM

## 2024-04-05 RX ORDER — ASPIRIN 81 MG/1
81 TABLET, CHEWABLE ORAL DAILY
Status: DISCONTINUED | OUTPATIENT
Start: 2024-04-06 | End: 2024-04-07 | Stop reason: HOSPADM

## 2024-04-05 RX ORDER — HYDROCODONE BITARTRATE AND ACETAMINOPHEN 5; 325 MG/1; MG/1
1 TABLET ORAL EVERY 6 HOURS PRN
Status: DISCONTINUED | OUTPATIENT
Start: 2024-04-05 | End: 2024-04-07 | Stop reason: HOSPADM

## 2024-04-05 RX ADMIN — HYDROCODONE BITARTRATE AND ACETAMINOPHEN 1 TABLET: 5; 325 TABLET ORAL at 23:16

## 2024-04-05 RX ADMIN — IOPAMIDOL 50 ML: 755 INJECTION, SOLUTION INTRAVENOUS at 17:36

## 2024-04-05 RX ADMIN — SODIUM CHLORIDE, PRESERVATIVE FREE 10 ML: 5 INJECTION INTRAVENOUS at 23:17

## 2024-04-05 ASSESSMENT — PAIN - FUNCTIONAL ASSESSMENT: PAIN_FUNCTIONAL_ASSESSMENT: 0-10

## 2024-04-05 ASSESSMENT — PAIN DESCRIPTION - ONSET: ONSET: PROGRESSIVE

## 2024-04-05 ASSESSMENT — PAIN SCALES - GENERAL
PAINLEVEL_OUTOF10: 6
PAINLEVEL_OUTOF10: 6

## 2024-04-05 ASSESSMENT — PAIN DESCRIPTION - LOCATION
LOCATION: HEAD
LOCATION: HEAD

## 2024-04-05 ASSESSMENT — PAIN DESCRIPTION - DESCRIPTORS: DESCRIPTORS: ACHING;THROBBING

## 2024-04-05 NOTE — ED PROVIDER NOTES
Emergency Department Provider Note       PCP: Miller Lyon APRN - NP   Age: 64 y.o.   Sex: male     DISPOSITION Decision To Admit 04/05/2024 08:31:34 PM       ICD-10-CM    1. Cerebrovascular accident (CVA), unspecified mechanism (HCC)  I63.9           Medical Decision Making     Patient presents with left arm and left leg weakness.  Patient having trouble walking.  Strength is 4 out of 5 on exam.  He is well out of any tPA window as symptoms have been greater than 24 hours.  Therefore, he is not a tPA candidate.  CTA did not show any evidence of LVO.  The symptoms are new according to him.  He just helped a friend move on Wednesday and everything was functioning normally.  Patient will need to be admitted for further workup and management.  Nothing to suggest hemorrhage.     1 or more acute illnesses that pose a threat to life or bodily function.   Discussion with external consultants.  Chronic medical problems impacting care include CVA.  Shared medical decision making was utilized in creating the patients health plan today.    I independently ordered and reviewed each unique test.  I reviewed external records: provider visit note from outside specialist.  I reviewed external records: previous lab results from outside ED.  I reviewed external records: previous imaging study including radiologist interpretation.     I interpreted the X-rays chest x-ray showed no evidence of infiltrate or edema and heart size is normal.  I reviewed and agree with radiology report.  I interpreted the CT Scan CT scan the brain shows no evidence of hemorrhage or infarct that was acute.  There was question of a subacute infarct near the area of old infarct in the left parietal.  No hemorrhage.  CTA of the head and neck showed no evidence of large vessel occlusion or dissection.  I have reviewed and agree with radiology report.  My Independent EKG Interpretation: sinus rhythm, no evidence of arrhythmia      ST Segments:Normal ST

## 2024-04-05 NOTE — ED TRIAGE NOTES
Patient reports of having stroke like symptoms since Thursday- headaches and abnormal gait. Patient had slur speech this morning. Patient had left sided weakness yesterday. Patient denies taking blood thinner. NIH= 3  Patient reports of blurry vision, shortness of breath.  Patient denies chest pain, abdominal pain and n/v.

## 2024-04-06 ENCOUNTER — APPOINTMENT (OUTPATIENT)
Dept: NON INVASIVE DIAGNOSTICS | Age: 64
DRG: 045 | End: 2024-04-06
Attending: INTERNAL MEDICINE
Payer: MEDICAID

## 2024-04-06 ENCOUNTER — APPOINTMENT (OUTPATIENT)
Dept: MRI IMAGING | Age: 64
DRG: 045 | End: 2024-04-06
Payer: MEDICAID

## 2024-04-06 LAB
ANION GAP SERPL CALC-SCNC: 6 MMOL/L (ref 2–11)
BUN SERPL-MCNC: 10 MG/DL (ref 8–23)
CALCIUM SERPL-MCNC: 8.7 MG/DL (ref 8.3–10.4)
CHLORIDE SERPL-SCNC: 110 MMOL/L (ref 103–113)
CHOLEST SERPL-MCNC: 244 MG/DL
CO2 SERPL-SCNC: 22 MMOL/L (ref 21–32)
CREAT SERPL-MCNC: 1.1 MG/DL (ref 0.8–1.5)
ECHO AO ASC DIAM: 3.7 CM
ECHO AO ASCENDING AORTA INDEX: 1.75 CM/M2
ECHO AO ROOT DIAM: 3.8 CM
ECHO AO ROOT INDEX: 1.79 CM/M2
ECHO AV AREA PEAK VELOCITY: 2.8 CM2
ECHO AV AREA VTI: 2.9 CM2
ECHO AV AREA/BSA PEAK VELOCITY: 1.3 CM2/M2
ECHO AV AREA/BSA VTI: 1.4 CM2/M2
ECHO AV MEAN GRADIENT: 6 MMHG
ECHO AV MEAN VELOCITY: 1.2 M/S
ECHO AV PEAK GRADIENT: 9 MMHG
ECHO AV PEAK VELOCITY: 1.5 M/S
ECHO AV VELOCITY RATIO: 0.93
ECHO AV VTI: 26.8 CM
ECHO BSA: 2.13 M2
ECHO EST RA PRESSURE: 3 MMHG
ECHO IVC PROX: 1.7 CM
ECHO LA AREA 2C: 21.8 CM2
ECHO LA AREA 4C: 18.3 CM2
ECHO LA DIAMETER INDEX: 1.89 CM/M2
ECHO LA DIAMETER: 4 CM
ECHO LA MAJOR AXIS: 6.1 CM
ECHO LA MINOR AXIS: 6 CM
ECHO LA TO AORTIC ROOT RATIO: 1.05
ECHO LA VOL BP: 54 ML (ref 18–58)
ECHO LA VOL MOD A2C: 65 ML (ref 18–58)
ECHO LA VOL MOD A4C: 44 ML (ref 18–58)
ECHO LA VOL/BSA BIPLANE: 25 ML/M2 (ref 16–34)
ECHO LA VOLUME INDEX MOD A2C: 31 ML/M2 (ref 16–34)
ECHO LA VOLUME INDEX MOD A4C: 21 ML/M2 (ref 16–34)
ECHO LV E' LATERAL VELOCITY: 12 CM/S
ECHO LV E' SEPTAL VELOCITY: 8 CM/S
ECHO LV EDV A2C: 115 ML
ECHO LV EDV A4C: 118 ML
ECHO LV EDV INDEX A4C: 56 ML/M2
ECHO LV EDV NDEX A2C: 54 ML/M2
ECHO LV EJECTION FRACTION A2C: 48 %
ECHO LV EJECTION FRACTION A4C: 58 %
ECHO LV EJECTION FRACTION BIPLANE: 53 % (ref 55–100)
ECHO LV ESV A2C: 59 ML
ECHO LV ESV A4C: 50 ML
ECHO LV ESV INDEX A2C: 28 ML/M2
ECHO LV ESV INDEX A4C: 24 ML/M2
ECHO LV FRACTIONAL SHORTENING: 33 % (ref 28–44)
ECHO LV INTERNAL DIMENSION DIASTOLE INDEX: 2.31 CM/M2
ECHO LV INTERNAL DIMENSION DIASTOLIC: 4.9 CM (ref 4.2–5.9)
ECHO LV INTERNAL DIMENSION SYSTOLIC INDEX: 1.56 CM/M2
ECHO LV INTERNAL DIMENSION SYSTOLIC: 3.3 CM
ECHO LV IVSD: 1.1 CM (ref 0.6–1)
ECHO LV MASS 2D: 226.4 G (ref 88–224)
ECHO LV MASS INDEX 2D: 106.8 G/M2 (ref 49–115)
ECHO LV POSTERIOR WALL DIASTOLIC: 1.3 CM (ref 0.6–1)
ECHO LV RELATIVE WALL THICKNESS RATIO: 0.53
ECHO LVOT AREA: 3.1 CM2
ECHO LVOT AV VTI INDEX: 0.91
ECHO LVOT DIAM: 2 CM
ECHO LVOT MEAN GRADIENT: 3 MMHG
ECHO LVOT PEAK GRADIENT: 7 MMHG
ECHO LVOT PEAK VELOCITY: 1.4 M/S
ECHO LVOT STROKE VOLUME INDEX: 36.1 ML/M2
ECHO LVOT SV: 76.6 ML
ECHO LVOT VTI: 24.4 CM
ECHO MV A VELOCITY: 0.71 M/S
ECHO MV E DECELERATION TIME (DT): 343 MS
ECHO MV E VELOCITY: 0.52 M/S
ECHO MV E/A RATIO: 0.73
ECHO MV E/E' LATERAL: 4.33
ECHO MV E/E' RATIO (AVERAGED): 5.42
ECHO PV MAX VELOCITY: 1 M/S
ECHO PV PEAK GRADIENT: 4 MMHG
ECHO RV BASAL DIMENSION: 3.4 CM
ECHO RV TAPSE: 3.2 CM (ref 1.7–?)
ERYTHROCYTE [DISTWIDTH] IN BLOOD BY AUTOMATED COUNT: 12.6 % (ref 11.9–14.6)
EST. AVERAGE GLUCOSE BLD GHB EST-MCNC: 103 MG/DL
GLUCOSE SERPL-MCNC: 165 MG/DL (ref 65–100)
HBA1C MFR BLD: 5.2 % (ref 4.8–5.6)
HCT VFR BLD AUTO: 38.6 % (ref 41.1–50.3)
HDLC SERPL-MCNC: 41 MG/DL (ref 40–60)
HDLC SERPL: 6
HGB BLD-MCNC: 13.5 G/DL (ref 13.6–17.2)
LDLC SERPL CALC-MCNC: ABNORMAL MG/DL
LDLC SERPL DIRECT ASSAY-MCNC: 165 MG/DL
MCH RBC QN AUTO: 34.4 PG (ref 26.1–32.9)
MCHC RBC AUTO-ENTMCNC: 35 G/DL (ref 31.4–35)
MCV RBC AUTO: 98.5 FL (ref 82–102)
NRBC # BLD: 0 K/UL (ref 0–0.2)
PLATELET # BLD AUTO: 240 K/UL (ref 150–450)
PMV BLD AUTO: 10.9 FL (ref 9.4–12.3)
POTASSIUM SERPL-SCNC: 3.6 MMOL/L (ref 3.5–5.1)
RBC # BLD AUTO: 3.92 M/UL (ref 4.23–5.6)
SODIUM SERPL-SCNC: 138 MMOL/L (ref 136–146)
TRIGL SERPL-MCNC: 435 MG/DL (ref 35–150)
VLDLC SERPL CALC-MCNC: 87 MG/DL (ref 6–23)
WBC # BLD AUTO: 5.5 K/UL (ref 4.3–11.1)

## 2024-04-06 PROCEDURE — 97112 NEUROMUSCULAR REEDUCATION: CPT

## 2024-04-06 PROCEDURE — 93306 TTE W/DOPPLER COMPLETE: CPT | Performed by: INTERNAL MEDICINE

## 2024-04-06 PROCEDURE — 97535 SELF CARE MNGMENT TRAINING: CPT

## 2024-04-06 PROCEDURE — 92610 EVALUATE SWALLOWING FUNCTION: CPT

## 2024-04-06 PROCEDURE — 83036 HEMOGLOBIN GLYCOSYLATED A1C: CPT

## 2024-04-06 PROCEDURE — 97165 OT EVAL LOW COMPLEX 30 MIN: CPT

## 2024-04-06 PROCEDURE — 6360000002 HC RX W HCPCS: Performed by: PHYSICIAN ASSISTANT

## 2024-04-06 PROCEDURE — 83721 ASSAY OF BLOOD LIPOPROTEIN: CPT

## 2024-04-06 PROCEDURE — 99222 1ST HOSP IP/OBS MODERATE 55: CPT | Performed by: STUDENT IN AN ORGANIZED HEALTH CARE EDUCATION/TRAINING PROGRAM

## 2024-04-06 PROCEDURE — 80048 BASIC METABOLIC PNL TOTAL CA: CPT

## 2024-04-06 PROCEDURE — 93306 TTE W/DOPPLER COMPLETE: CPT

## 2024-04-06 PROCEDURE — 2580000003 HC RX 258: Performed by: PHYSICIAN ASSISTANT

## 2024-04-06 PROCEDURE — 1100000003 HC PRIVATE W/ TELEMETRY

## 2024-04-06 PROCEDURE — A4216 STERILE WATER/SALINE, 10 ML: HCPCS | Performed by: PHYSICIAN ASSISTANT

## 2024-04-06 PROCEDURE — 6370000000 HC RX 637 (ALT 250 FOR IP): Performed by: INTERNAL MEDICINE

## 2024-04-06 PROCEDURE — 97530 THERAPEUTIC ACTIVITIES: CPT

## 2024-04-06 PROCEDURE — 97161 PT EVAL LOW COMPLEX 20 MIN: CPT

## 2024-04-06 PROCEDURE — 6370000000 HC RX 637 (ALT 250 FOR IP): Performed by: STUDENT IN AN ORGANIZED HEALTH CARE EDUCATION/TRAINING PROGRAM

## 2024-04-06 PROCEDURE — 80061 LIPID PANEL: CPT

## 2024-04-06 PROCEDURE — 36415 COLL VENOUS BLD VENIPUNCTURE: CPT

## 2024-04-06 PROCEDURE — 92523 SPEECH SOUND LANG COMPREHEN: CPT

## 2024-04-06 PROCEDURE — 85027 COMPLETE CBC AUTOMATED: CPT

## 2024-04-06 PROCEDURE — 2580000003 HC RX 258: Performed by: INTERNAL MEDICINE

## 2024-04-06 PROCEDURE — 70551 MRI BRAIN STEM W/O DYE: CPT

## 2024-04-06 RX ORDER — CLOPIDOGREL BISULFATE 75 MG/1
300 TABLET ORAL ONCE
Status: COMPLETED | OUTPATIENT
Start: 2024-04-06 | End: 2024-04-06

## 2024-04-06 RX ORDER — CLOPIDOGREL BISULFATE 75 MG/1
75 TABLET ORAL DAILY
Status: DISCONTINUED | OUTPATIENT
Start: 2024-04-07 | End: 2024-04-07 | Stop reason: HOSPADM

## 2024-04-06 RX ORDER — AMLODIPINE BESYLATE 2.5 MG/1
2.5 TABLET ORAL DAILY
Status: DISCONTINUED | OUTPATIENT
Start: 2024-04-07 | End: 2024-04-07 | Stop reason: HOSPADM

## 2024-04-06 RX ADMIN — SODIUM CHLORIDE, PRESERVATIVE FREE 10 ML: 5 INJECTION INTRAVENOUS at 20:21

## 2024-04-06 RX ADMIN — SODIUM CHLORIDE, PRESERVATIVE FREE 10 ML: 5 INJECTION INTRAVENOUS at 08:18

## 2024-04-06 RX ADMIN — ACETAMINOPHEN 650 MG: 325 TABLET ORAL at 20:21

## 2024-04-06 RX ADMIN — SODIUM CHLORIDE 1 MG: 9 INJECTION INTRAMUSCULAR; INTRAVENOUS; SUBCUTANEOUS at 11:35

## 2024-04-06 RX ADMIN — CLOPIDOGREL BISULFATE 300 MG: 75 TABLET ORAL at 10:12

## 2024-04-06 RX ADMIN — ASPIRIN 81 MG: 81 TABLET, CHEWABLE ORAL at 08:16

## 2024-04-06 RX ADMIN — ACETAMINOPHEN 650 MG: 325 TABLET ORAL at 08:13

## 2024-04-06 ASSESSMENT — PAIN DESCRIPTION - LOCATION
LOCATION: HEAD
LOCATION: HEAD

## 2024-04-06 ASSESSMENT — PAIN DESCRIPTION - DESCRIPTORS: DESCRIPTORS: ACHING

## 2024-04-06 ASSESSMENT — PAIN SCALES - GENERAL
PAINLEVEL_OUTOF10: 3
PAINLEVEL_OUTOF10: 3
PAINLEVEL_OUTOF10: 2
PAINLEVEL_OUTOF10: 2

## 2024-04-06 NOTE — ED NOTES
TRANSFER - OUT REPORT:    Verbal report given to April RN on Henry Jarrett  being transferred to [facility] for routine progression of patient care       Report consisted of patient's Situation, Background, Assessment and   Recommendations(SBAR).     Information from the following report(s) Nurse Handoff Report and ED SBAR was reviewed with the receiving nurse.    Jackson Fall Assessment:    Presents to emergency department  because of falls (Syncope, seizure, or loss of consciousness): No  Age > 70: No  Altered Mental Status, Intoxication with alcohol or substance confusion (Disorientation, impaired judgment, poor safety awaremess, or inability to follow instructions): No  Impaired Mobility: Ambulates or transfers with assistive devices or assistance; Unable to ambulate or transer.: No  Nursing Judgement: No          Lines:   Peripheral IV 04/05/24 Left Antecubital (Active)   Site Assessment Clean, dry & intact 04/05/24 1555   Line Status Blood return noted;Flushed 04/05/24 1555   Phlebitis Assessment No symptoms 04/05/24 1555   Infiltration Assessment 0 04/05/24 1555   Alcohol Cap Used No 04/05/24 1555   Dressing Status New dressing applied 04/05/24 1555   Dressing Type Transparent 04/05/24 1555   Dressing Intervention New 04/05/24 1555        Opportunity for questions and clarification was provided.      Patient transported with:  Registered Nurse           Ying Hayden RN  04/05/24 3734

## 2024-04-06 NOTE — CONSULTS
Neurology Consult Note       History:   64-year-old male with history of tobacco use, hyperlipidemia, remote small vessel strokes presents with worsening left-sided weakness for 2 to 3 days prior to admission.  Compliant with aspirin but still smokes.  Not currently on lipid-lowering agent, does not tolerate statins or Zetia.     Exam: Pertinent positives and negatives include:  Awake alert and oriented.  No obvious facial droop or dysarthria.  No abnormal involuntary saccades.  4+ out of 5 weakness on the left side.  Otherwise moving all extremities spontaneously and with purpose.     Imaging and review of data:   MRI brain with acute infarct right ventral pontine region.  Remote left medial pontine ischemic infarct. No hemorrhage.    CT angiogram unremarkable      Assessment and Plan:   64-year-old male with the above history presents with left-sided weakness for 2 to 3 days prior to admission, found to have acute ischemic stroke in the right ventral pontine region.     Recommendations:  Aspirin Plavix for 21 days, then antiplatelet monotherapy.     He does not tolerate statins or Zetia.  He is an ideal candidate for PCSK9 inhibitors (Repatha), though has had issues getting it presumably due to cost and insurance approval.     Tobacco cessation is essential.  He verbalized understanding.  He is not interested in taking a medication to assist.     He requests social work referral, and referral to a new PCP.  He will also see us in clinic.     No further workup is necessary at this time.  TTE can be obtained outpatient as it is unlikely to .  Neurology will sign off.       Roby Alcantar DO  Neurology      Cumulative time spent today was 40 minutes which included chart review, obtaining history (from patient, family, or other providers), review of images, examining the patient, and counseling the patient and/or family on medical condition.

## 2024-04-06 NOTE — H&P
Medications:  Current Outpatient Medications   Medication Instructions    aspirin 81 mg, Oral, DAILY       I have personally reviewed labs and tests:  Recent Results (from the past 24 hour(s))   CMP    Collection Time: 04/05/24  3:47 PM   Result Value Ref Range    Sodium 139 136 - 146 mmol/L    Potassium 4.1 3.5 - 5.1 mmol/L    Chloride 110 103 - 113 mmol/L    CO2 25 21 - 32 mmol/L    Anion Gap 4 2 - 11 mmol/L    Glucose 121 (H) 65 - 100 mg/dL    BUN 10 8 - 23 MG/DL    Creatinine 1.00 0.8 - 1.5 MG/DL    Est, Glom Filt Rate 85 >60 ml/min/1.73m2    Calcium 9.3 8.3 - 10.4 MG/DL    Total Bilirubin 0.4 0.2 - 1.1 MG/DL    ALT 29 12 - 65 U/L    AST 12 (L) 15 - 37 U/L    Alk Phosphatase 76 50 - 136 U/L    Total Protein 7.9 6.3 - 8.2 g/dL    Albumin 3.8 3.2 - 4.6 g/dL    Globulin 4.1 2.8 - 4.5 g/dL    Albumin/Globulin Ratio 0.9 0.4 - 1.6     CBC with Auto Differential    Collection Time: 04/05/24  3:47 PM   Result Value Ref Range    WBC 6.1 4.3 - 11.1 K/uL    RBC 4.19 (L) 4.23 - 5.6 M/uL    Hemoglobin 14.0 13.6 - 17.2 g/dL    Hematocrit 41.3 41.1 - 50.3 %    MCV 98.6 82 - 102 FL    MCH 33.4 (H) 26.1 - 32.9 PG    MCHC 33.9 31.4 - 35.0 g/dL    RDW 12.8 11.9 - 14.6 %    Platelets 259 150 - 450 K/uL    MPV 11.0 9.4 - 12.3 FL    nRBC 0.00 0.0 - 0.2 K/uL    Differential Type AUTOMATED      Neutrophils % 42 (L) 43 - 78 %    Lymphocytes % 45 (H) 13 - 44 %    Monocytes % 10 4.0 - 12.0 %    Eosinophils % 3 0.5 - 7.8 %    Basophils % 0 0.0 - 2.0 %    Immature Granulocytes % 0 0.0 - 5.0 %    Neutrophils Absolute 2.6 1.7 - 8.2 K/UL    Lymphocytes Absolute 2.7 0.5 - 4.6 K/UL    Monocytes Absolute 0.6 0.1 - 1.3 K/UL    Eosinophils Absolute 0.2 0.0 - 0.8 K/UL    Basophils Absolute 0.0 0.0 - 0.2 K/UL    Immature Granulocytes Absolute 0.0 0.0 - 0.5 K/UL   Protime-INR    Collection Time: 04/05/24  3:47 PM   Result Value Ref Range    Protime 13.1 11.3 - 14.9 sec    INR 0.9     EKG 12 Lead    Collection Time: 04/05/24  4:04 PM   Result Value Ref

## 2024-04-07 ENCOUNTER — APPOINTMENT (OUTPATIENT)
Dept: ULTRASOUND IMAGING | Age: 64
DRG: 045 | End: 2024-04-07
Payer: MEDICAID

## 2024-04-07 VITALS
OXYGEN SATURATION: 97 % | WEIGHT: 197 LBS | TEMPERATURE: 97.8 F | HEIGHT: 72 IN | RESPIRATION RATE: 16 BRPM | DIASTOLIC BLOOD PRESSURE: 94 MMHG | SYSTOLIC BLOOD PRESSURE: 155 MMHG | HEART RATE: 84 BPM | BODY MASS INDEX: 26.68 KG/M2

## 2024-04-07 LAB
ANION GAP SERPL CALC-SCNC: 3 MMOL/L (ref 2–11)
BUN SERPL-MCNC: 14 MG/DL (ref 8–23)
CALCIUM SERPL-MCNC: 8.8 MG/DL (ref 8.3–10.4)
CHLORIDE SERPL-SCNC: 110 MMOL/L (ref 103–113)
CO2 SERPL-SCNC: 24 MMOL/L (ref 21–32)
CREAT SERPL-MCNC: 1.1 MG/DL (ref 0.8–1.5)
ERYTHROCYTE [DISTWIDTH] IN BLOOD BY AUTOMATED COUNT: 12.6 % (ref 11.9–14.6)
GLUCOSE SERPL-MCNC: 122 MG/DL (ref 65–100)
HCT VFR BLD AUTO: 39.1 % (ref 41.1–50.3)
HGB BLD-MCNC: 13.7 G/DL (ref 13.6–17.2)
MCH RBC QN AUTO: 34.3 PG (ref 26.1–32.9)
MCHC RBC AUTO-ENTMCNC: 35 G/DL (ref 31.4–35)
MCV RBC AUTO: 97.8 FL (ref 82–102)
NRBC # BLD: 0 K/UL (ref 0–0.2)
PLATELET # BLD AUTO: 287 K/UL (ref 150–450)
PMV BLD AUTO: 10.8 FL (ref 9.4–12.3)
POTASSIUM SERPL-SCNC: 4.1 MMOL/L (ref 3.5–5.1)
RBC # BLD AUTO: 4 M/UL (ref 4.23–5.6)
SODIUM SERPL-SCNC: 137 MMOL/L (ref 136–146)
WBC # BLD AUTO: 3.9 K/UL (ref 4.3–11.1)

## 2024-04-07 PROCEDURE — 36415 COLL VENOUS BLD VENIPUNCTURE: CPT

## 2024-04-07 PROCEDURE — 76870 US EXAM SCROTUM: CPT

## 2024-04-07 PROCEDURE — 6370000000 HC RX 637 (ALT 250 FOR IP): Performed by: STUDENT IN AN ORGANIZED HEALTH CARE EDUCATION/TRAINING PROGRAM

## 2024-04-07 PROCEDURE — 80048 BASIC METABOLIC PNL TOTAL CA: CPT

## 2024-04-07 PROCEDURE — 85027 COMPLETE CBC AUTOMATED: CPT

## 2024-04-07 PROCEDURE — 6370000000 HC RX 637 (ALT 250 FOR IP): Performed by: INTERNAL MEDICINE

## 2024-04-07 PROCEDURE — 2580000003 HC RX 258: Performed by: INTERNAL MEDICINE

## 2024-04-07 PROCEDURE — 6370000000 HC RX 637 (ALT 250 FOR IP): Performed by: PHYSICIAN ASSISTANT

## 2024-04-07 RX ORDER — NICOTINE 21 MG/24HR
1 PATCH, TRANSDERMAL 24 HOURS TRANSDERMAL DAILY PRN
Qty: 30 PATCH | Refills: 3 | Status: SHIPPED | OUTPATIENT
Start: 2024-04-07

## 2024-04-07 RX ORDER — CLOPIDOGREL BISULFATE 75 MG/1
75 TABLET ORAL DAILY
Qty: 20 TABLET | Refills: 0 | Status: SHIPPED | OUTPATIENT
Start: 2024-04-08 | End: 2024-04-28

## 2024-04-07 RX ORDER — ASPIRIN 81 MG/1
81 TABLET ORAL DAILY
Qty: 30 TABLET | Refills: 3 | Status: SHIPPED | OUTPATIENT
Start: 2024-04-07

## 2024-04-07 RX ORDER — AMLODIPINE BESYLATE 2.5 MG/1
2.5 TABLET ORAL DAILY
Qty: 30 TABLET | Refills: 3 | Status: SHIPPED | OUTPATIENT
Start: 2024-04-08

## 2024-04-07 RX ADMIN — AMLODIPINE BESYLATE 2.5 MG: 2.5 TABLET ORAL at 08:34

## 2024-04-07 RX ADMIN — CLOPIDOGREL BISULFATE 75 MG: 75 TABLET ORAL at 08:34

## 2024-04-07 RX ADMIN — ACETAMINOPHEN 650 MG: 325 TABLET ORAL at 13:44

## 2024-04-07 RX ADMIN — ASPIRIN 81 MG: 81 TABLET, CHEWABLE ORAL at 08:35

## 2024-04-07 RX ADMIN — SODIUM CHLORIDE, PRESERVATIVE FREE 10 ML: 5 INJECTION INTRAVENOUS at 08:38

## 2024-04-07 ASSESSMENT — PAIN SCALES - GENERAL: PAINLEVEL_OUTOF10: 3

## 2024-04-07 ASSESSMENT — PAIN DESCRIPTION - LOCATION: LOCATION: HEAD

## 2024-04-07 ASSESSMENT — PAIN DESCRIPTION - DESCRIPTORS: DESCRIPTORS: ACHING

## 2024-04-07 NOTE — DISCHARGE SUMMARY
Hospitalist Discharge Summary   Admit Date:  2024  3:50 PM   DC Note date: 2024  Name:  Henry Jarrett   Age:  64 y.o.  Sex:  male  :  1960   MRN:  [id number]   Room:  [facility]  PCP:  Miller Lyon APRN - NP    Presenting Complaint: Extremity Weakness (Left sided) and Headache     Initial Admission Diagnosis: Acute cerebrovascular accident (CVA) (HCC) [I63.9]  Cerebrovascular accident (CVA), unspecified mechanism (HCC) [I63.9]     Problem List for this Hospitalization (present on admission):    Principal Problem:    Acute cerebrovascular accident (CVA) (HCC)  Active Problems:    HTN (hypertension)    Tobacco use    Headache    Neck pain    Thoracic aortic aneurysm (HCC)  Resolved Problems:    * No resolved hospital problems. *      Hospital Course:  Henry Jarrett is a 64 y.o. male with medical history of CVA, HTN, HLP unable to take statin, continued tobacco use who is evaluated with several days of expressive aphasia, left sided weakness as well as feeling drunken and staggering.  Prior to admission he had been on aspirin but no longer is taking Plavix.  He has had prior strokes in the past and was being seen for dysarthria outpatient by speech pathology but he felt his deficits both physically and vocally were worse than usual. CT head was concerning for CVA. CTA head neck unremarkable. MRI of the brain showed acute infarct right ventral pontine region.  Remote left medial pontine ischemic infarct. No hemorrhage. Echo without apical thrombus, atrial dilation or PFO. He was started on DAPT of which he should continue for 21 days then transition to monotherapy there after. He does not tolerate statins or Zetia.  He is an ideal candidate for PCSK9 inhibitors (Repatha), though has had issues getting it presumably due to cost and insurance approval (notably TG >400 and ). On exam he presented awake alert and oriented without obvious facial droop and only mild dysarthria.  He presented with  4+

## 2024-04-07 NOTE — PROGRESS NOTES
4 Eyes Skin Assessment     NAME:  Henry Jarrett  YOB: 1960  MEDICAL RECORD NUMBER:  [id number]    The patient is being assessed for  Admission    I agree that at least one RN has performed a thorough Head to Toe Skin Assessment on the patient. ALL assessment sites listed below have been assessed.      Areas assessed by both nurses:    Head, Face, Ears, Shoulders, Back, Chest, Arms, Elbows, Hands, Sacrum. Buttock, Coccyx, Ischium, Legs. Feet and Heels, and Under Medical Devices         Does the Patient have a Wound? No noted wound(s)       Gordon Prevention initiated by RN: No  Wound Care Orders initiated by RN: No    Pressure Injury (Stage 3,4, Unstageable, DTI, NWPT, and Complex wounds) if present, place Wound referral order by RN under : No    New Ostomies, if present place, Ostomy referral order under : No     Nurse 1 eSignature: Electronically signed by Aubrie Koehler RN on 4/6/24 at 2:36 AM EDT    **SHARE this note so that the co-signing nurse can place an eSignature**    Nurse 2 eSignature: Electronically signed by Vianney Jordan RN on 4/6/24 at 2:36 AM EDT    
ACUTE OCCUPATIONAL THERAPY GOALS:   (Developed with and agreed upon by patient and/or caregiver.)  1. Patient will complete lower body bathing and dressing with modified independence and adaptive equipment as needed.   2. Patient will complete toileting with modified independence.   3. Patient will tolerate 30 minutes of OT treatment with 1-2 rest breaks to increase activity tolerance for ADLs.   4. Patient will complete functional transfers with modified independence and adaptive equipment as needed.   5. Patient will complete functional mobility for household distances with modified independence and good safety awareness.   6. Patient will complete 15 minutes of L UE coordination activities/strengthening exercises to improve independence with ADL.   Timeframe: 7 visits       OCCUPATIONAL THERAPY Initial Assessment, Daily Note, and PM       OT Visit Days: 1  Acknowledge Orders  Time  OT Charge Capture  Rehab Caseload Tracker      Henry Jarrett is a 64 y.o. male   PRIMARY DIAGNOSIS: Acute cerebrovascular accident (CVA) (HCC)  Acute cerebrovascular accident (CVA) (HCC) [I63.9]  Cerebrovascular accident (CVA), unspecified mechanism (HCC) [I63.9]       Reason for Referral: Generalized Muscle Weakness (M62.81)  Other lack of cordination (R27.8)  Hemiplegia and hemiparesis following cerebral infarction affecting left non-dominant side (I69.354)  Inpatient: Payor: [employer] [place] MEDICAID / Plan: [employer] [place] MEDICA[place] / Product Type: *No Product type* /     ASSESSMENT:     REHAB RECOMMENDATIONS:   Recommendation to date pending progress:  Setting:  Outpatient Therapy    Equipment:    To Be Determined     ASSESSMENT:  Mr. Jarrett presents to the hospital with acute CVA with L sided weakness. Pt was found to have R pontine acute infarct. Pt lives with his wife and is typically independent at baseline. Pt was going to outpatient occupational therapy prior to admission and reports hx of strokes in the past. Pt 
GOALS:  LTG: Patient will maintain adequate hydration/nutrition with optimum safety and efficiency of swallowing function with PO intake without overt signs or symptoms of aspiration for the highest appropriate diet level.  STG:  Patient will complete a Modified Barium Swallow study to fully assess physiology and anatomy of the swallow and determine safest appropriate diet and/or rehabilitation strategies, as medically indicated.    LTG: Patient will develop functional and intelligible speech and utilize compensatory strategies to improve communication across environments.  STG:  Patient will utilize compensatory strategies across tasks with 100% intelligibility given minimal cueing.    SPEECH LANGUAGE PATHOLOGY: COMBINED Dysphagia and Cognitive-Linguistic Initial Assessment    Acknowledge Order  I  Therapy Time  I   Charges     I  Rehab Caseload Tracker    NAME: Henry Jarrett  : 1960  MRN: [id number]    ADMISSION DATE: 2024  PRIMARY DIAGNOSIS: Acute cerebrovascular accident (CVA) (HCC)    ICD-10: Treatment Diagnosis: R13.12 Dysphagia, Oropharyngeal Phase  R47.1 Dysarthria and Anarthria    RECOMMENDATIONS   Diet:    Regular Consistency  Thin Liquids    Medication: as tolerated   Compensatory Swallowing Strategies:   Upright as possible for all oral intake   Therapeutic Intervention:   Patient/family education  Instrumental swallow assessment   Patient continues to require skilled intervention:  Yes. Recommend ongoing speech therapy services during this hospitalization.     Anticipated Discharge Needs:  OP speech therapy is recommended upon discharge     ASSESSMENT    Patient presents with functional oropharyngeal swallow function. No overt s/sx of dysphagia observed with trials at bedside. Patient does endorse sensation of liquid getting \"hung up\" and causes occasional issues since prior stroke on 2023. Given prior CVAs and current concerns will plan for instrumental swallow assess during inpatient 
MRI screening complete. Charted and signed. Pt does state he has claustrophobia and has had to have meds in the past, but states he \"might be ok with towel over my face and ill be ok\".      NIHHS: initial transfer nihhs was 1, because of left leg droop. Since, patient has a 0 NIHHS, leg stability improved.     
Uneventful night. NIHHS remains 0. No complaints, VSS  Education about stroke, lifestyle changes, tobacco cessation provided. Patient states that when he returns home he will quit smoking and not \"drink mountain dew\" anymore.   Wife verifies that he has nicotine patches at home and they are covered by his insurance.   He agrees that he will start to use them.     Pamphlet for stroke, general health education book and \"S.T.A.R.T\" sheet (smoking cessation) provided to patient and family.        
duration of hospital stay or until stated goals are met, whichever comes first.    THERAPY PROGNOSIS: Excellent    PROBLEM LIST:   (Skilled intervention is medically necessary to address:)  Decreased ADL/Functional Activities  Decreased Activity Tolerance  Decreased AROM/PROM  Decreased Balance  Decreased Gait Ability  Decreased Strength  Decreased Transfer Abilities INTERVENTIONS PLANNED:   (Benefits and precautions of physical therapy have been discussed with the patient.)  Self Care Training  Therapeutic Activity  Therapeutic Exercise/HEP  Neuromuscular Re-education  Gait Training  Education       TREATMENT:   EVALUATION: LOW COMPLEXITY: (Untimed Charge)  The initial evaluation charge encompasses clinical chart review, objective assessment, interpretation of assessment, and skilled monitoring of the patient's response to treatment in order to develop a plan of care.     TREATMENT:   Co-Treatment PT/OT necessary due to patient's decreased overall endurance/tolerance levels, as well as need for high level skilled assistance to complete functional transfers/mobility and functional tasks  Therapeutic Activity (23 Minutes): Therapeutic activity included Rolling, Supine to Sit, Scooting, Transfer Training, Ambulation on level ground, Sitting balance , and Standing balance to improve functional Activity tolerance, Balance, Coordination, Mobility, and Strength.    TREATMENT GRID:  N/A    AFTER TREATMENT PRECAUTIONS: Bed, Call light within reach, Needs within reach, and Side rails x2    INTERDISCIPLINARY COLLABORATION:  RN/ PCT and OT/ OCASIO    EDUCATION: Education Given To: Patient  Education Provided: Role of Therapy;Plan of Care  Education Method: Verbal  Barriers to Learning: None  Education Outcome: Verbalized understanding    TIME IN/OUT:  Time In: 1408  Time Out: 1436  Minutes: 28    HENRIETTA ZAPATA, PT    
grammatical/other typographical errors.

## 2024-04-08 ENCOUNTER — HOSPITAL ENCOUNTER (OUTPATIENT)
Dept: PHYSICAL THERAPY | Age: 64
Setting detail: RECURRING SERIES
End: 2024-04-08
Payer: MEDICAID

## 2024-04-08 ENCOUNTER — APPOINTMENT (OUTPATIENT)
Dept: PHYSICAL THERAPY | Age: 64
End: 2024-04-08
Payer: MEDICAID
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
## 2024-04-08 NOTE — PROGRESS NOTES
Henry Jarrett  : 1960  Primary: [facility] Medicaid  Secondary:  [facility] @ [place] [address]  [place] [address]  Phone: [phone]  Fax: [phone] Plan Frequency: 2x per week    Plan of Care/Certification Expiration Date: 24      OT Visit Info:  Total # of Visits Approved: 12 (10/4/23 to 2024)  Total # of Visits to Date: 4  Progress Note Counter: 4  Progress Note Due Date: 23  Canceled Appointment: 2      OUTPATIENT OCCUPATIONAL THERAPY 2024     Appt Desk   Episode   MyChart      Mr. Jarrett was a same-day cancellation for today's appointment as he still feels very weak.     Cancellation Number: 2     Gwyn Galloway, OT    Future Appointments   Date Time Provider Department Center   2024  2:30 PM Enedelia Porter, PT [facility]   2024  3:15 PM Gwyn Galloway, OT [facility]   4/10/2024  2:30 PM Karina Adams, PTA [facility]   4/10/2024  3:15 PM Ida Mcghee, SLP [facility]   4/15/2024  1:45 PM Ida Mcghee, SLP [facility]   4/15/2024  2:30 PM Karina Adams, PTA [facility]   4/15/2024  3:15 PM Gwyn Galloway, OT [facility]   2024  1:45 PM Ida Mcghee, SLP [facility]   2024  2:30 PM Gwyn Galloway, OT [facility]   2024  3:15 PM Enedelia Porter, PT [facility]   2024  1:45 PM Ida Mcghee, SLP [facility]   2024  2:30 PM Karina Adams, PTA [facility]   2024  3:15 PM Gwyn Galloway, OT [facility]   2024  1:45 PM Ida Mcghee, SLP [facility]   2024  2:30 PM Kairna Adams PTA [facility]   2024  3:15 PM Gwyn Galloway, BRYCE [facility]

## 2024-04-10 ENCOUNTER — HOSPITAL ENCOUNTER (OUTPATIENT)
Dept: PHYSICAL THERAPY | Age: 64
Setting detail: RECURRING SERIES
End: 2024-04-10
Payer: MEDICAID
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
## 2024-04-11 NOTE — ADT AUTH CERT
PT HAS NO OTHER PRIMARY COVERAGE. PRIMARY PLAN PLANNED ADM TERMED 23 PER SELWYN REP.        Patient Demographics    Name Patient ID SSN Gender Identity Birth Date   Henry Jarrett [id number]  Male [birth year] (64 yrs)     Address Phone Email Employer    [address] [id number] [phone] (H)  [phone] (H) [email or web address] NOT EMPLOYED      County Race Occupation Emp Status    [place] /  -- Not Employed      Reg Status PCP Date Last Verified Next Review Date    Verified Miller Lyon APRN - [facility]  [phone] 24      Admission Date Discharge Date Admitting Provider     24 Cehrie Perez,        Marital Status Yazdanism      Single Religion        Emergency Contact 1   Renetta Jarrett (2)  [phone] (M)     Subscriber Details  Hospital Account #[id number]  [employer] Subscriber Name/Sex/Relation Subscriber  Subscriber Address/Phone Subscriber Emp/Emp Phone   1. [employer] [facility] [place] MEDICAID  [id number] HENRY JARRETT - Male  (Self) 1960 [address] [place] [address]  [phone](H) UNKNOWN     Utilization Reviews       24  by Corrina Zaragoza, BENY   Last Updated by Corrina Zaragoza, RN on 2024 1030     Review Status Created By   In Primary Corrina Zaragoza RN       Review Type   --      Criteria Review   DATE: 24        DAY 2        LOC: Acute medical         Hospital Course:    64 y.o. male with medical history of CVA, HTN, HLP unable to take statin, tobacco use who is evaluated with several days of expressive aphasia, left sided weakness as well as feeling drunken and staggering.  Prior to admission he had been on aspirin but no longer is taking Plavix.  He still smokes.  He has had prior strokes in the past and was being seen for dysarthria outpatient by speech pathology        Subjective & 24hr Events:   Continues to feel that he has left-sided weakness and dysarthria.  States his symptoms are about the

## 2024-04-13 ENCOUNTER — HOSPITAL ENCOUNTER (INPATIENT)
Age: 64
LOS: 1 days | Discharge: HOME | DRG: 058 | End: 2024-04-14
Attending: EMERGENCY MEDICINE | Admitting: FAMILY MEDICINE
Payer: MEDICAID

## 2024-04-13 ENCOUNTER — APPOINTMENT (OUTPATIENT)
Dept: CT IMAGING | Age: 64
DRG: 058 | End: 2024-04-13
Payer: MEDICAID

## 2024-04-13 DIAGNOSIS — Z86.73 H/O: CVA (CEREBROVASCULAR ACCIDENT): ICD-10-CM

## 2024-04-13 DIAGNOSIS — R53.1 ACUTE LEFT-SIDED WEAKNESS: Primary | ICD-10-CM

## 2024-04-13 PROBLEM — R47.1 DYSARTHRIA: Status: ACTIVE | Noted: 2024-04-13

## 2024-04-13 LAB
ANION GAP SERPL CALC-SCNC: 8 MMOL/L (ref 2–11)
BUN SERPL-MCNC: 12 MG/DL (ref 8–23)
CALCIUM SERPL-MCNC: 9 MG/DL (ref 8.3–10.4)
CHLORIDE SERPL-SCNC: 108 MMOL/L (ref 103–113)
CO2 SERPL-SCNC: 23 MMOL/L (ref 21–32)
CREAT SERPL-MCNC: 1 MG/DL (ref 0.8–1.5)
ERYTHROCYTE [DISTWIDTH] IN BLOOD BY AUTOMATED COUNT: 12.1 % (ref 11.9–14.6)
GLUCOSE BLD STRIP.AUTO-MCNC: 151 MG/DL (ref 65–100)
GLUCOSE SERPL-MCNC: 163 MG/DL (ref 65–100)
HCT VFR BLD AUTO: 39.6 % (ref 41.1–50.3)
HGB BLD-MCNC: 14 G/DL (ref 13.6–17.2)
INR BLD: 1 (ref 0.9–1.2)
MCH RBC QN AUTO: 34.4 PG (ref 26.1–32.9)
MCHC RBC AUTO-ENTMCNC: 35.4 G/DL (ref 31.4–35)
MCV RBC AUTO: 97.3 FL (ref 82–102)
NRBC # BLD: 0 K/UL (ref 0–0.2)
PLATELET # BLD AUTO: 249 K/UL (ref 150–450)
PMV BLD AUTO: 11 FL (ref 9.4–12.3)
POTASSIUM SERPL-SCNC: 4 MMOL/L (ref 3.5–5.1)
PT BLD: 12.5 SECS (ref 9.6–11.6)
RBC # BLD AUTO: 4.07 M/UL (ref 4.23–5.6)
SERVICE CMNT-IMP: ABNORMAL
SODIUM SERPL-SCNC: 139 MMOL/L (ref 136–146)
WBC # BLD AUTO: 5.7 K/UL (ref 4.3–11.1)

## 2024-04-13 PROCEDURE — 82962 GLUCOSE BLOOD TEST: CPT

## 2024-04-13 PROCEDURE — G0378 HOSPITAL OBSERVATION PER HR: HCPCS

## 2024-04-13 PROCEDURE — 99285 EMERGENCY DEPT VISIT HI MDM: CPT

## 2024-04-13 PROCEDURE — 70498 CT ANGIOGRAPHY NECK: CPT

## 2024-04-13 PROCEDURE — 1100000003 HC PRIVATE W/ TELEMETRY

## 2024-04-13 PROCEDURE — 70450 CT HEAD/BRAIN W/O DYE: CPT

## 2024-04-13 PROCEDURE — 6360000004 HC RX CONTRAST MEDICATION: Performed by: EMERGENCY MEDICINE

## 2024-04-13 PROCEDURE — 85610 PROTHROMBIN TIME: CPT

## 2024-04-13 PROCEDURE — 2580000003 HC RX 258: Performed by: FAMILY MEDICINE

## 2024-04-13 PROCEDURE — 6370000000 HC RX 637 (ALT 250 FOR IP): Performed by: FAMILY MEDICINE

## 2024-04-13 PROCEDURE — 80048 BASIC METABOLIC PNL TOTAL CA: CPT

## 2024-04-13 PROCEDURE — 85027 COMPLETE CBC AUTOMATED: CPT

## 2024-04-13 RX ORDER — ENOXAPARIN SODIUM 100 MG/ML
40 INJECTION SUBCUTANEOUS DAILY
Status: DISCONTINUED | OUTPATIENT
Start: 2024-04-14 | End: 2024-04-14 | Stop reason: HOSPADM

## 2024-04-13 RX ORDER — LABETALOL HYDROCHLORIDE 5 MG/ML
10 INJECTION, SOLUTION INTRAVENOUS EVERY 10 MIN PRN
Status: DISCONTINUED | OUTPATIENT
Start: 2024-04-13 | End: 2024-04-14 | Stop reason: HOSPADM

## 2024-04-13 RX ORDER — ASPIRIN 81 MG/1
81 TABLET ORAL DAILY
Status: DISCONTINUED | OUTPATIENT
Start: 2024-04-14 | End: 2024-04-13 | Stop reason: DRUGHIGH

## 2024-04-13 RX ORDER — CLOPIDOGREL BISULFATE 75 MG/1
75 TABLET ORAL DAILY
Status: DISCONTINUED | OUTPATIENT
Start: 2024-04-14 | End: 2024-04-14 | Stop reason: HOSPADM

## 2024-04-13 RX ORDER — ONDANSETRON 4 MG/1
4 TABLET, ORALLY DISINTEGRATING ORAL EVERY 8 HOURS PRN
Status: DISCONTINUED | OUTPATIENT
Start: 2024-04-13 | End: 2024-04-14 | Stop reason: HOSPADM

## 2024-04-13 RX ORDER — SODIUM CHLORIDE 9 MG/ML
INJECTION, SOLUTION INTRAVENOUS PRN
Status: DISCONTINUED | OUTPATIENT
Start: 2024-04-13 | End: 2024-04-14 | Stop reason: HOSPADM

## 2024-04-13 RX ORDER — ASPIRIN 325 MG
325 TABLET ORAL DAILY
Status: DISCONTINUED | OUTPATIENT
Start: 2024-04-14 | End: 2024-04-14 | Stop reason: HOSPADM

## 2024-04-13 RX ORDER — SODIUM CHLORIDE 0.9 % (FLUSH) 0.9 %
5-40 SYRINGE (ML) INJECTION EVERY 12 HOURS SCHEDULED
Status: DISCONTINUED | OUTPATIENT
Start: 2024-04-13 | End: 2024-04-14 | Stop reason: HOSPADM

## 2024-04-13 RX ORDER — ONDANSETRON 2 MG/ML
4 INJECTION INTRAMUSCULAR; INTRAVENOUS EVERY 6 HOURS PRN
Status: DISCONTINUED | OUTPATIENT
Start: 2024-04-13 | End: 2024-04-14 | Stop reason: HOSPADM

## 2024-04-13 RX ORDER — SODIUM CHLORIDE 0.9 % (FLUSH) 0.9 %
5-40 SYRINGE (ML) INJECTION PRN
Status: DISCONTINUED | OUTPATIENT
Start: 2024-04-13 | End: 2024-04-14 | Stop reason: HOSPADM

## 2024-04-13 RX ORDER — CLOPIDOGREL BISULFATE 75 MG/1
300 TABLET ORAL ONCE
Status: COMPLETED | OUTPATIENT
Start: 2024-04-13 | End: 2024-04-13

## 2024-04-13 RX ORDER — POLYETHYLENE GLYCOL 3350 17 G/17G
17 POWDER, FOR SOLUTION ORAL DAILY PRN
Status: DISCONTINUED | OUTPATIENT
Start: 2024-04-13 | End: 2024-04-14 | Stop reason: HOSPADM

## 2024-04-13 RX ORDER — AMLODIPINE BESYLATE 5 MG/1
2.5 TABLET ORAL DAILY
Status: DISCONTINUED | OUTPATIENT
Start: 2024-04-14 | End: 2024-04-14 | Stop reason: HOSPADM

## 2024-04-13 RX ORDER — ASPIRIN 300 MG/1
300 SUPPOSITORY RECTAL DAILY
Status: DISCONTINUED | OUTPATIENT
Start: 2024-04-14 | End: 2024-04-13

## 2024-04-13 RX ADMIN — CLOPIDOGREL BISULFATE 300 MG: 75 TABLET ORAL at 22:02

## 2024-04-13 RX ADMIN — IOPAMIDOL 100 ML: 755 INJECTION, SOLUTION INTRAVENOUS at 20:06

## 2024-04-13 RX ADMIN — SODIUM CHLORIDE, PRESERVATIVE FREE 10 ML: 5 INJECTION INTRAVENOUS at 23:08

## 2024-04-13 NOTE — ED TRIAGE NOTES
Pt arrives seated on wheelchair accompanied by with c/o \"pain to left side of body\", slurred speech, and left sided weakness; onset one hour ago. Recent hospital admission for stroke that affected left side of body. States symptoms had resolved upon discharge from hospital per patient and spouse. Take Plavix and ASA.

## 2024-04-14 ENCOUNTER — APPOINTMENT (OUTPATIENT)
Dept: MRI IMAGING | Age: 64
DRG: 058 | End: 2024-04-14
Payer: MEDICAID

## 2024-04-14 VITALS
BODY MASS INDEX: 27.37 KG/M2 | RESPIRATION RATE: 18 BRPM | HEIGHT: 72 IN | HEART RATE: 64 BPM | TEMPERATURE: 98.2 F | WEIGHT: 202.1 LBS | SYSTOLIC BLOOD PRESSURE: 122 MMHG | OXYGEN SATURATION: 96 % | DIASTOLIC BLOOD PRESSURE: 84 MMHG

## 2024-04-14 PROBLEM — R53.1 ACUTE LEFT-SIDED WEAKNESS: Status: RESOLVED | Noted: 2024-04-13 | Resolved: 2024-04-14

## 2024-04-14 PROBLEM — R47.1 DYSARTHRIA: Status: RESOLVED | Noted: 2024-04-13 | Resolved: 2024-04-14

## 2024-04-14 LAB
ANION GAP SERPL CALC-SCNC: 7 MMOL/L (ref 2–11)
BUN SERPL-MCNC: 12 MG/DL (ref 8–23)
CALCIUM SERPL-MCNC: 8.8 MG/DL (ref 8.3–10.4)
CHLORIDE SERPL-SCNC: 109 MMOL/L (ref 103–113)
CO2 SERPL-SCNC: 22 MMOL/L (ref 21–32)
CREAT SERPL-MCNC: 1.1 MG/DL (ref 0.8–1.5)
ERYTHROCYTE [DISTWIDTH] IN BLOOD BY AUTOMATED COUNT: 12.3 % (ref 11.9–14.6)
GLUCOSE SERPL-MCNC: 179 MG/DL (ref 65–100)
HCT VFR BLD AUTO: 38.1 % (ref 41.1–50.3)
HGB BLD-MCNC: 13 G/DL (ref 13.6–17.2)
MCH RBC QN AUTO: 33.7 PG (ref 26.1–32.9)
MCHC RBC AUTO-ENTMCNC: 34.1 G/DL (ref 31.4–35)
MCV RBC AUTO: 98.7 FL (ref 82–102)
NRBC # BLD: 0 K/UL (ref 0–0.2)
PLATELET # BLD AUTO: 233 K/UL (ref 150–450)
PMV BLD AUTO: 11.1 FL (ref 9.4–12.3)
POTASSIUM SERPL-SCNC: 3.7 MMOL/L (ref 3.5–5.1)
RBC # BLD AUTO: 3.86 M/UL (ref 4.23–5.6)
SODIUM SERPL-SCNC: 138 MMOL/L (ref 136–146)
WBC # BLD AUTO: 5.3 K/UL (ref 4.3–11.1)

## 2024-04-14 PROCEDURE — 6370000000 HC RX 637 (ALT 250 FOR IP): Performed by: FAMILY MEDICINE

## 2024-04-14 PROCEDURE — 80048 BASIC METABOLIC PNL TOTAL CA: CPT

## 2024-04-14 PROCEDURE — G0378 HOSPITAL OBSERVATION PER HR: HCPCS

## 2024-04-14 PROCEDURE — 97165 OT EVAL LOW COMPLEX 30 MIN: CPT

## 2024-04-14 PROCEDURE — 85027 COMPLETE CBC AUTOMATED: CPT

## 2024-04-14 PROCEDURE — 2580000003 HC RX 258: Performed by: FAMILY MEDICINE

## 2024-04-14 PROCEDURE — 97535 SELF CARE MNGMENT TRAINING: CPT

## 2024-04-14 PROCEDURE — 97530 THERAPEUTIC ACTIVITIES: CPT

## 2024-04-14 PROCEDURE — 97161 PT EVAL LOW COMPLEX 20 MIN: CPT

## 2024-04-14 PROCEDURE — 92523 SPEECH SOUND LANG COMPREHEN: CPT

## 2024-04-14 PROCEDURE — 36415 COLL VENOUS BLD VENIPUNCTURE: CPT

## 2024-04-14 PROCEDURE — 92610 EVALUATE SWALLOWING FUNCTION: CPT

## 2024-04-14 RX ADMIN — ASPIRIN 325 MG: 325 TABLET, FILM COATED ORAL at 09:06

## 2024-04-14 RX ADMIN — CLOPIDOGREL BISULFATE 75 MG: 75 TABLET ORAL at 09:07

## 2024-04-14 RX ADMIN — SODIUM CHLORIDE, PRESERVATIVE FREE 10 ML: 5 INJECTION INTRAVENOUS at 09:08

## 2024-04-14 RX ADMIN — AMLODIPINE BESYLATE 2.5 MG: 5 TABLET ORAL at 09:07

## 2024-04-14 NOTE — PROGRESS NOTES
Patient given a Patient Stroke Education book.  Patient educated on signs and symptoms of stroke and importance of getting to nearest ED as soon as symptoms occur.  Patient educated on risk factors of stroke.

## 2024-04-14 NOTE — PROGRESS NOTES
GOALS:  Follow up with outpatient speech therapy.    SPEECH LANGUAGE PATHOLOGY: COMBINED COMMUNICATION and DYSPHAGIA Initial Assessment and Discharge    Acknowledge Order  I  Therapy Time  I   Charges     I  Rehab Caseload Tracker    NAME: Henry Jarrett  : 1960  MRN: [id number]    ADMISSION DATE: 2024  PRIMARY DIAGNOSIS: Acute left-sided weakness    ICD-10: Treatment Diagnosis: R13.12 Dysphagia, Oropharyngeal Phase  R47.1 Dysarthria and Anarthria  R48.2 Apraxia    RECOMMENDATIONS   Diet:    Regular Consistency  Thin Liquids    Medication: as tolerated   Compensatory Swallowing Strategies:   Alternate solids and liquids  Remain upright for 30-45 minutes after meals  Upright as possible for all oral intake   Therapeutic Intervention:   Patient/family education  Consider referral to Outpatient speech therapy   Patient continues to require skilled intervention:  No. Please re-consult if new concerns arise.      Anticipated Discharge Needs: Ongoing speech therapy is recommended at next level of care.      ASSESSMENT    Patient presents with changes in speech and mobility yesterday. Patient was followed by outpatient speech therapy prior to last admission for CVA for speech fluency and clarity. Per patient he has not returned to his baseline but that the novel deficits in speech which brought him here have resolved. No persistent acute speech or swallowing changes, however recent overall decline in these areas with CVA 2024.     Tolerated regular diet textures and thin liquids without overt s/sx. Reports he has to focus a lot more to swallow and at times will get \"choked\" on water.     Recommend continue regular diet textures, thin liquids. Follow up outpatient with speech therapy to address recent decline in swallow and speech functions which are stabilized at this point. No acute speech therapy needs.     GENERAL    Subjective: Pleasant, teary about the overall loss of the ability to enjoy his  assistance and unable to attend to own bodily needs without assistance.    [] 5 Severe disability Bedridden, incontinent, and requiring constant nursing care and attention.        PLAN    Duration/Frequency: No acute treatment indicated at this time    Rehabilitation Potential For Stated Goals: Good    Interdisciplinary Collaboration: PT/ PTA and OT/ OCASIO    Medical Necessity    No additional ACUTE speech therapy indicated at this time.   Outpatient speech therapy recommended to address persistent deficits     Education:   Patient educated on Results of evaluation, Speech therapy recommendations, Role of speech therapy, SLP plan, and Diet recommendations  Education provided to Patient  Education response: Verbalizes understanding    Safety:   Call light within reach  In chair    Therapy Time:  Time In: 1002  Time Out: 1025  Minutes: 23    SMITA Hernandez  4/14/2024 10:58 AM

## 2024-04-14 NOTE — PROGRESS NOTES
ACUTE OCCUPATIONAL THERAPY GOALS:   (Developed with and agreed upon by patient and/or caregiver.)  1. Pt will tolerate at least 8 minutes of OT treatment requiring 1-2 breaks as needed.   2. Pt will complete grooming tasks while standing at sink CGA.  3. Pt will complete functional mobility via rolling walker contact guard assistance.  ALL GOALS MET    OCCUPATIONAL THERAPY Initial Assessment, Daily Note, and AM       OT Visit Days: 1  Acknowledge Orders  Time  OT Charge Capture  Rehab Caseload Tracker      Henry Jarrett is a 64 y.o. male   PRIMARY DIAGNOSIS: Acute left-sided weakness  Acute left-sided weakness [R53.1]       Reason for Referral: Generalized Muscle Weakness (M62.81)  Inpatient: Payor: [employer] MEDICAID / Plan: [employer] MEDICAID / Product Type: *No Product type* /     ASSESSMENT:     REHAB RECOMMENDATIONS:   Recommendation to date pending progress:  Setting:  Outpatient Therapy    Equipment:    None     ASSESSMENT:  Mr. Jarrett is a 64 y M with a hx of CVA, HTN. Pt was admitted for acute L sided weakness. Pt was DC from hospital last week from acute infarct affecting L side as well. Pt with hx of recurring infarcts. Pt was received supine in bed. Pt required assistance for functional mobility and ADLs today due to L sided weakness. Pt states L sided weakness has not worsened since admission last weekend. Pt wants to and needs to continue outpatient therapy services. Pt has deficits in strength, balance, activity tolerance, and performing ADLs. Pt requires continued skilled OT services due to performing functionally below baseline.       [facility] AM-PAC™ “6 Clicks” Daily Activity Inpatient Short Form:    AM-PAC Daily Activity - Inpatient   How much help is needed for putting on and taking off regular lower body clothing?: None  How much help is needed for bathing (which includes washing, rinsing, drying)?: A Little  How much help is needed for toileting (which includes

## 2024-04-14 NOTE — PROGRESS NOTES
ACUTE PHYSICAL THERAPY GOALS:   (Developed with and agreed upon by patient and/or caregiver.)    (1.) Henry Jarrett  will move from supine to sit and sit to supine  with INDEPENDENCE within 7 treatment day(s).    (2.) Henry Jarrett will transfer from bed to chair and chair to bed with MODIFIED INDEPENDENCE using the least restrictive device within 7 treatment day(s).    (3.) Henry Jarrett will ambulate with MODIFIED INDEPENDENCE for 500 feet with the least restrictive device within 7 treatment day(s).   (4.) Henry Jarrett will perform standing static and dynamic balance activities x 20 minutes with SUPERVISION to improve safety within 7 treatment day(s).  (5.) Henry Jarrett will perform therapeutic exercises x 20 min for HEP with INDEPENDENCE to improve strength, endurance, and functional mobility within 7 treatment day(s).       PHYSICAL THERAPY Initial Assessment, Daily Note, and AM  (Link to Caseload Tracking: PT Visit Days : 1  Acknowledge Orders  Time In/Out  PT Charge Capture  Rehab Caseload Tracker    Henry Jarrett is a 64 y.o. male   PRIMARY DIAGNOSIS: Acute left-sided weakness  Acute left-sided weakness [R53.1]       Reason for Referral: Generalized Muscle Weakness (M62.81)  Difficulty in walking, Not elsewhere classified (R26.2)  Inpatient: Payor: [employer] [place] MEDICA[place] / Plan: [employer] [place] / Product Type: *No Product type* /     ASSESSMENT:     REHAB RECOMMENDATIONS:   Recommendation to date pending progress:  Setting:  Outpatient Therapy    Equipment:    To Be Determined     ASSESSMENT:  Mr. Jarrett  is a 64 year old M who presents to hospital with L sided weakness, headache, slurred speech. He was just admitted last weekend for similar reasons and was found to have a R ventricular pontine infarct and has a history of other CVA's as well. At baseline, pt is independent with ADL's and mobility. He reports he has a RW at home but has not been using it. This date pt performs

## 2024-04-14 NOTE — ED PROVIDER NOTES
Emergency Department Provider Note       PCP: Miller Lyon APRN - [facility]   Age: 64 y.o.   Sex: male     DISPOSITION       No diagnosis found.    Medical Decision Making     Code stroke was called at the time of my evaluation.    I discussed the case with the neurologist.  Patient is not a TNK candidate.  We will plan to admit for further stroke evaluation.    ED Course as of 04/13/24 2054   Sat Apr 13, 2024 2001 Head CT scan is negative.  I discussed the case with neurology.  Patient has a fairly complicated history.  He is not a TNK candidate.  We will get a CTA. [AC]      ED Course User Index  [AC] Jamil Woods MD     1 or more acute illnesses that pose a threat to life or bodily function.   Shared medical decision making was utilized in creating the patients health plan today.    I independently ordered and reviewed each unique test.       I interpreted the CT Scan no obvious intracranial hemorrhage.    The patient was admitted and I have discussed patient management with the admitting provider.  The management of this patient was discussed with an external consultant.      ===================================================================  This patient is critically ill and there is a high probability of of imminent or life threatening deterioration in the patient's condition without immediate management.    The nature of the patient's clinical problem is: Left-sided weakness    I have spent 30 minutes in direct patient care, documentation, review of labs/xrays/old records, discussion with Family, Staff, Colleague, Nursing .     The time involved in the performance of separately reportable procedures was not counted toward critical care time.     Jamil Woods MD; 4/13/2024 @9:00 PM  ===================================================================            History     64-year-old gentleman presents with concerns about left-sided weakness and slurred speech that he says started around 615  Unremarkable  Left Internal Carotid Artery, Cervical Segment: Unremarkable    Right Vertebral Artery, Cervical Segment: Unremarkable  Left Vertebral Artery, Cervical Segment: Unremarkable    INTRACRANIAL VASCULATURE:    Right Internal Carotid Artery: Unremarkable  Right Middle Cerebral Artery: Unremarkable    Left Internal Carotid Artery: Unremarkable  Left middle cerebral artery: Unremarkable    Right Anterior Cerebral Artery: Unremarkable  Left Anterior Cerebral Artery: Unremarkable  Anterior Communicating Artery: Unremarkable    Right V4 Segment: Unremarkable  Left V4 Segment: Unremarkable    Right PICA: Unremarkable  Left PICA: Unremarkable    Basilar Artery: Unremarkable    Right AICA: Unremarkable  Left AICA: Unremarkable    Right Superior Cerebellar Artery: Unremarkable  Left Superior Cerebellar Artery: Unremarkable    Right Posterior Cerebral Artery: Unremarkable  Left Posterior Cerebral Artery: Unremarkable    Dural Venous Sinuses: Grossly unremarkable.    MISCELLANEOUS FINDINGS:    Visualized brain: There our 2 hypodensities within the alpesh. Correlation to  prior MRI demonstrated a chronic infarction in the left paramedian alpesh and an  acute infarction in the right paramedian alpesh. The acute infarction in the right  side of the alpesh currently measures up to 8.5 x 9.5 mm.    Paranasal sinuses: Mucosal disease in the maxillary and left ethmoid sinuses    Lung Apices: The visualized lung apices are clear.    Neck Soft Tissues: Unremarkable    Orbital Soft Tissues: Unremarkable visualized portions of the orbits.    Osseous structures: No suspicious lytic or blastic bony lesions.      Impression    No evidence of large vessel occlusion, aneurysm, or hemodynamically significant  stenosis within the vasculature of the head and neck.    Hypodensities in the alpesh are present. An acute infarction was seen in the right  paramedian alpesh on recent MRI and a chronic infarction in the left paramedian  alpesh.     CBC

## 2024-04-14 NOTE — PROGRESS NOTES
Stroke Education provided to patient and the following topics were discussed    1. Patients personal risk factors for stroke are hypertension    2. Warning signs of Stroke:        * Sudden numbness or weakness of the face, arm or leg, especially on one side of          The body            * Sudden confusion, trouble speaking or understanding        * Sudden trouble seeing in one or both eyes        * Sudden trouble walking, dizziness, loss of balance or coordination        * Sudden severe headache with no known cause      3. Importance of activation Emergency Medical Services ( 9-1-1 ) immediately if experience any warning signs of stroke.    4. Be sure and schedule a follow-up appointment with your primary care doctor or any specialists as instructed.     5. You must take medicine every day to treat your risk factors for stroke.  Be sure to take your medicines exactly as your doctor tells you: no more, no less.  Know what your medicines are for , what they do.  Anti-thrombotics /anticoagulants can help prevent strokes.  You are taking the following medicine(s)  40 lovenox     6.  Smoking and second-hand smoke greatly increase your risk of stroke, cardiovascular disease and death. Smoking history never    7. Information provided was Stroke Handouts    8. Documentation of teaching completed in Patient Education Activity and on Care Plan with teaching response noted?  yes

## 2024-04-14 NOTE — ED NOTES
TRANSFER - OUT REPORT:    Verbal report given to BENY Mariee on Henry Jarrett  being transferred to [facility] for routine progression of patient care       Report consisted of patient's Situation, Background, Assessment and   Recommendations(SBAR).     Information from the following report(s) ED SBAR and Neuro Assessment was reviewed with the receiving nurse.    Cely Fall Assessment:    Presents to emergency department  because of falls (Syncope, seizure, or loss of consciousness): No  Age > 70: No  Altered Mental Status, Intoxication with alcohol or substance confusion (Disorientation, impaired judgment, poor safety awaremess, or inability to follow instructions): Yes  Impaired Mobility: Ambulates or transfers with assistive devices or assistance; Unable to ambulate or transer.: Yes             Lines:   Peripheral IV 04/13/24 Right Antecubital (Active)        Opportunity for questions and clarification was provided.      Patient transported with:  Monitor and Registered Nurse          Keyur Sterling RN  04/13/24 4506

## 2024-04-14 NOTE — CARE COORDINATION
Pt chart reviewed for discharge planning. CM met with pt, verified demographic information/health insurance. PCP was confirmed. Pt is normally independent, drives, and lives in a one level home with spouse. The home has a tub shower with grab bars. Pt reports he has a walker at home. Pt has SF out pt PT/OT in the past. However, he denies being currently active with any home care or outside services. Pt is for discharge home today with family and no needs/supportive care orders recieved for CM at this time.      04/14/24 8625   Service Assessment   Patient Orientation Alert and Oriented   Cognition Alert   History Provided By Patient   Primary Caregiver Self   Support Systems Spouse/Significant Other   Patient's Healthcare Decision Maker is: Legal Next of Kin   PCP Verified by CM Yes  (Black)   Prior Functional Level Independent in ADLs/IADLs   Current Functional Level Independent in ADLs/IADLs   Can patient return to prior living arrangement Yes   Ability to make needs known: Good   Family able to assist with home care needs: Yes   Would you like for me to discuss the discharge plan with any other family members/significant others, and if so, who? No   Financial Resources Medicaid  ([place])   Community Resources None   Social/Functional History   Lives With Family   Type of Home House   Home Layout One level   Home Access Level entry   Bathroom Shower/Tub Tub/Shower unit   Bathroom Equipment Shower chair   Home Equipment Walker, rolling   Receives Help From Family   ADL Assistance Independent   Ambulation Assistance Independent   Active  Yes   Discharge Planning   Type of Residence House   Living Arrangements Spouse/Significant Other;Children   Current Services Prior To Admission None   Potential Assistance Needed N/A   DME Ordered? No   Potential Assistance Purchasing Medications No   Type of Home Care Services None   Patient expects to be discharged to: House   One/Two Story Residence One story   Services

## 2024-04-14 NOTE — H&P
Hospitalist History and Physical   Admit Date:  2024  7:31 PM   Name:  Henry Jarrett   Age:  64 y.o.  Sex:  male  :  1960   MRN:  [id number]   Room:  1/1    Presenting/Chief Complaint: Aphasia     Reason(s) for Admission: Acute left-sided weakness [R53.1]     History of Present Illness:   Henry Jarrett is a 64 y.o. male with medical history  64 y.o. male with medical history of CVA, HTN, HLP unable to take statin or Zetia, h/o smoking on nicotine patch.  As per the previous discharge summary trouble getting Repatha secondary to cost and insurance approval.    Patient was recently discharged from our service on  with a discharge diagnosis of acute infarct of right ventricular pontine region.    Today he presents with a similar symptoms like prior having speech problems, difficulty walking from his [place] to chair and headache(posterior aspect dull constant).  Says the symptoms were worse than his baseline, decided to come to emergency room for further evaluation.    Code S was called, as per teleneurology not a candidate for tPA, recommended admission for CVA workup.    CT head -subacute and chronic pontine stroke  CTA head and neck no large vessel occlusion, showed a recent right pontine infarct and chronic left pontine infarct.  EKG sinus, no acute ST-T wave changes  Normal CBC and BMP.    Patient be admitted for acute left-sided weakness, dysarthria/expressive aphasia and headache    Assessment & Plan:      Acute left-sided weakness/dysarthria/expressive aphasia, headache  Code S called teleneurology recommended admission, not a candidate for tPA.  CT-subacute and chronic pontine stroke , CTA head and neck no large vessel occlusion,recent right pontine infarct and chronic left pontine infarct.  Do not have papers from teleneurology consult.  Able to swallow at bedside, will order aspirin 325 and also Plavix 300 mg.  Patient says he is compliant with his aspirin and Plavix.  HLP unable to

## 2024-04-14 NOTE — DISCHARGE SUMMARY
Hospitalist Discharge Summary   Admit Date:  2024  7:31 PM   DC Note date: 2024  Name:  Henry Jarrett   Age:  64 y.o.  Sex:  male  :  1960   MRN:  [id number]   Room:  [facility]  PCP:  Miller Lyon APRN - NP    Presenting Complaint: Aphasia     Initial Admission Diagnosis: Acute left-sided weakness [R53.1]     Problem List for this Hospitalization (present on admission):    Principal Problem:    Acute left-sided weakness  Active Problems:    Mixed hyperlipidemia    HTN (hypertension)    Thoracic aortic aneurysm (HCC)    Dysarthria    H/O: CVA (cerebrovascular accident)  Resolved Problems:    * No resolved hospital problems. *      Hospital Course:  Henry Jarrett is a 64 y.o. male with medical history  64 y.o. AA male with medical history of CVA, HTN, HLP unable to take statin or Zetia, h/o smoking on nicotine patch.  Recently discharged from [facility] 2024 for acute CVA \"6.8 mm right pontine acute infarct\"; he followed up with [facility] 4/10/2024 and was instructed to cont DAPT along with ongoing attempts to get Repatha approved by insurance as he is statin / zetia intolerant and to continue with Topamax for headache.  From previous CVA , pt did have a 30-day holter monitor which was negative for cardiac dysrhythmia.  He is to f/u with [facility] in 3 mths.      He presented to the ER on admission day c/o worsened dysarthria and gait disturbance along with headache. Code S called; pt evaluated by teleneurology and deemed not a candidate for tPA, recommended admission for CVA workup.  His CT head revealed subacute and chronic pontine stroke; CTA head and neck no large vessel occlusion, showed a recent right pontine infarct and chronic left pontine infarct.  EKG sinus, no acute ST-T wave changes.    This AM on my exam, pt reports his acute symptoms have entirely resolved and speech was back to former baseline.  He had no significant difficulty ambulating in the room and was  20 doses  Qty: 20 tablet, Refills: 0      amLODIPine (NORVASC) 2.5 MG tablet Take 1 tablet by mouth daily  Qty: 30 tablet, Refills: 3             Some of the medications may be marked as \"stop taking\" by the system; but in reality pt or family reported already being off these meds; defer to outpatient/prescribing providers.      Procedures done this admission:  * No surgery found *    Consults this admission:  None    Echocardiogram results:  04/05/24    ECHO (TTE) COMPLETE (PRN CONTRAST/BUBBLE/STRAIN/3D) 04/06/2024  4:03 PM (Final)    Interpretation Summary    Left Ventricle: Normal left ventricular systolic function with a visually estimated EF of 55 - 60%. Left ventricle size is normal. Mildly increased wall thickness. Findings consistent with mild concentric hypertrophy. Normal wall motion. Normal diastolic function.    Left Atrium: Left atrium is mildly dilated.    Interatrial Septum: No interatrial shunt visualized with color Doppler. Agitated saline study was negative with and without provocation.    Aorta: Mildly dilated aortic root. Ao root diameter is 3.8 cm. Mildly dilated ascending aorta. Ao ascending diameter is 3.7 cm.    Image quality is adequate.    Signed by: Simone John MD on 4/6/2024  4:03 PM      Diagnostic Imaging/Tests:   CTA HEAD NECK W WO CONTRAST    Result Date: 4/13/2024  No evidence of large vessel occlusion, aneurysm, or hemodynamically significant stenosis within the vasculature of the head and neck. Hypodensities in the alpesh are present. An acute infarction was seen in the right paramedian alpesh on recent MRI and a chronic infarction in the left paramedian alpesh.     CT HEAD WO CONTRAST    Result Date: 4/13/2024  1. Subacute and chronic pontine strokes.    US SCROTUM AND TESTICLES    Result Date: 4/7/2024  1. Normal testicles. No evidence for torsion.    MRI brain without contrast    Result Date: 4/6/2024  6.8 mm right pontine acute infarct. Small vessel ischemic disease. Chronic

## 2024-04-14 NOTE — PROGRESS NOTES
TRANSFER - IN REPORT:    Verbal report received from Andrei Baer on Henry Jarrett  being received from ED for routine progression of patient care      Report consisted of patient's Situation, Background, Assessment and   Recommendations(SBAR).     Information from the following report(s) Nurse Handoff Report and ED SBAR was reviewed with the receiving nurse.    Opportunity for questions and clarification was provided.      Assessment completed upon patient's arrival to unit and care assumed.

## 2024-04-15 ENCOUNTER — APPOINTMENT (OUTPATIENT)
Dept: PHYSICAL THERAPY | Age: 64
End: 2024-04-15
Payer: MEDICAID

## 2024-04-15 ENCOUNTER — HOSPITAL ENCOUNTER (OUTPATIENT)
Dept: PHYSICAL THERAPY | Age: 64
Setting detail: RECURRING SERIES
End: 2024-04-15
Payer: MEDICAID

## 2024-04-15 NOTE — PROGRESS NOTES
Henry Jarrett  : 1960  Primary: [facility] Medicaid  Secondary:  [facility] [place] [address]  [facility] [address]  Phone: [phone]  Fax: [phone] Plan Frequency: 2x per week    Plan of Care/Certification Expiration Date: 24      PT Visit Info:  Total # of Visits Approved: 99  Total # of Visits to Date: 3  Progress Note Counter: 3  Progress Note Due Date: 03/15/24  No Show: 5  Canceled Appointment: 6         OUTPATIENT PHYSICAL THERAPY 4/15/2024     Appt Desk   Episode   MyChart      Mr. Jarrett was a >24 hour cancellation for today's appointment.      Karina Adams PTA    Future Appointments   Date Time Provider Department Center   4/15/2024  7:00 PM Gwyn Galloway, OT [facility]   4/15/2024  7:00 PM Karina Adams, PTA [facility]   4/15/2024  7:00 PM Ida Mcghee SLP [facility]   2024  1:45 PM Ida Mcghee SLP [facility]   2024  2:30 PM Gwyn Galloway, OT [facility]   2024  3:15 PM Enedelia Porter, PT [facility]   2024  3:00 PM Amina Anderson, APRN [facility]   2024  1:45 PM Ida Mcghee SLP [facility]   2024  2:30 PM Karina Adams, PTA [facility]   2024  3:15 PM Gwyn Galloway, OT [facility]   2024  1:45 PM Ida Mcghee SLP [facility]   2024  2:30 PM Karina Adams, PTA [facility]   2024  3:15 PM Gwyn Galloway, OT [facility]

## 2024-04-15 NOTE — PROGRESS NOTES
Henry Jarrett  : 1960  Primary: [facility] Medicaid  Secondary:  [facility] @ [place] [address]  [place] [address]  Phone: [phone]  Fax: [phone]    SLP Visit Info:  No Show: 0  Canceled Appointment: 4 (4/15 hospitalized over weekend)  Total # of Visits to Date: 2      OUTPATIENT THERAPY: 4/15/2024  Episode  Appt Desk        Henry Jarrett cancelled his appointment for today due to  hospitalized over weekend .  Patient to return when able.   Thank you,  SMITA Barlow    Future Appointments   Date Time Provider Department Center   4/15/2024  7:00 PM Gwyn Galloway, OT [facility]   4/15/2024  7:00 PM Karina Adams, PTA [facility]   4/15/2024  7:00 PM Ida Mcghee SLP [facility]   2024  1:45 PM Ida Mcghee SLP [facility]   2024  2:30 PM Gwyn Galloway, OT [facility]   2024  3:15 PM Enedelia Porter, PT [facility]   2024  3:00 PM Amina Anderson APRN [facility]   2024  1:45 PM Ida Mcghee SLP [facility]   2024  2:30 PM Karina Adams, PTA [facility]   2024  3:15 PM Gwyn Galloway, OT [facility]   2024  1:45 PM Ida Mcghee, SLP [facility]   2024  2:30 PM Karina Adams, PTA [facility]   2024  3:15 PM Gwyn Galloway, OT [facility]

## 2024-04-17 ENCOUNTER — HOSPITAL ENCOUNTER (OUTPATIENT)
Dept: PHYSICAL THERAPY | Age: 64
Setting detail: RECURRING SERIES
Discharge: HOME | End: 2024-04-20
Payer: MEDICAID

## 2024-04-17 PROCEDURE — 97110 THERAPEUTIC EXERCISES: CPT

## 2024-04-17 PROCEDURE — 97164 PT RE-EVAL EST PLAN CARE: CPT

## 2024-04-17 PROCEDURE — 92610 EVALUATE SWALLOWING FUNCTION: CPT

## 2024-04-17 PROCEDURE — 97168 OT RE-EVAL EST PLAN CARE: CPT

## 2024-04-17 PROCEDURE — 97530 THERAPEUTIC ACTIVITIES: CPT

## 2024-04-17 ASSESSMENT — PAIN SCALES - GENERAL: PAINLEVEL_OUTOF10: 3

## 2024-04-17 ASSESSMENT — PAIN DESCRIPTION - PAIN TYPE: TYPE: ACUTE PAIN

## 2024-04-17 ASSESSMENT — PAIN DESCRIPTION - LOCATION: LOCATION: SHOULDER

## 2024-04-17 NOTE — PROGRESS NOTES
Henry Jarrett  : 1960  Primary: [facility] Medicaid (Medicaid Managed)  Secondary:  [facility] @ [place] [address]  [facility] [address]  Phone: [phone]  Fax: [phone]    Plan of Care/Certification Expiration Date:  Certification Period Expiration Date: 24      Frequency/Duration:   Plan Frequency: 1-2 times/week      Time In/Out:   Time In: 1430  Time Out: 1515    OT Visit Info:  Plan Frequency: 1-2 times/week  Progress Note Due Date: 24  Total # of Visits Approved: 0 (see electronic chart)  Total # of Visits to Date: 1  Progress Note Counter: 1  Canceled Appointment: 2       Visit Count: Visit count could not be calculated. Make sure you are using a visit which is associated with an episode.   OUTPATIENT OCCUPATIONAL THERAPY: Treatment Note 2024  Episode: (OT: CVA)         Treatment Diagnosis:    No data found  Muscle weakness (generalized)  Unsteadiness on feet  Medical/Referring Diagnosis:   Cerebrovascular disease, unspecified Weakness [R53.1]  Referring Physician:  Jennifer Garvin FNP MD Orders:  OT Eval and Treat   Return MD Appt:     Date of Onset:  Onset Date:  (2024)  Allergies:  Atorvastatin  Restrictions/Precautions:   None      Medications Last Reviewed:  2024     Interventions Planned (Treatment may consist of any combination of the following):  Current Treatment Recommendations: Strengthening; ROM; Neuromuscular re-education; Pain management; Safety education & training; Equipment evaluation, education, & procurement; Modalities; Positioning; Dry needling; Self-Care / ADL; Manual Therapy:  STM; Manual Therapy:  Jt Manip; Coordination training; Other (comment) (orthotic management and training)    See Assessment Note      Subjective Comments:   Patient states his son-in law had to go to the ER.  States he no longer takes BP medicine.  Needs to follow up with PCP.   >Initial Pain Level:   Shoulder

## 2024-04-17 NOTE — THERAPY RECERTIFICATION
Henry Jarrett  : 1960  Primary: [facility] Medicaid  Secondary:  [facility] @ [place] [address]  [facility] [address]  Phone: [phone]  Fax: [phone]    Visit Info:    Effective Dates  2024 to 2024   Frequency/Duration    1-2 time(s) a week for 30-60 days   SPEECH LANGUAGE PATHOLOGY: COMMUNICATION    Re-evaluation 2024     Appt Desk   Episode        Treatment Diagnosis:    Dysarthria and anarthria  Cognitive communication deficit  Medical/Referring Diagnosis:  Weakness [R53.1]  Referring Physician:  Jennifer Garvin FNP MD Orders:  Eval and Treat   Return MD Appt:  Unknown  Date of Onset:  23  Allergies:  Atorvastatin  Medications Last Reviewed:  2024     SUBJECTIVE    History of Injury/Illness (Reason for Referral):  Patient known to this clinic, requesting referral back for therapy with c/o struggling with his right side. Patient with continued c/o speech changes and noticing some memory difficulties, though he feels that have improved some in the last couple months. Reports forgetting what he walked into a room for, forgetting what he went into the store to buy, and difficulty remembering what he was doing, if wife talks/requests something of him while he is engaged in another task. Pt states he is highly motivated to continue to make improvements. 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     Previous hx: Pt with acute CVA 23 MRI shows new 6 mm R alpesh infarct. Prior to this, Pt with CVA affecting R side 2023. L pontine stroke. States on 23 he was staggering, change in vision, and had slurred speech. He reports a previous stroke about a year ago and he was able to recover from the first in 3-4 weeks.     24: Pt presented to ED  and  with new CVA. Per chart, MRI of the brain showed acute infarct right ventral pontine region.

## 2024-04-17 NOTE — THERAPY EVALUATION
Henry Jarrett  : 1960  Primary: [facility] Medicaid (Medicaid Managed)  Secondary:  [facility] @ [place] [address]  [facility] [address]  Phone: [phone]  Fax: [phone] Plan Frequency: 2x per week    Plan of Care/Certification Expiration Date: 24        Plan of Care/Certification Expiration Date:  Plan of Care/Certification Expiration Date: 24    Frequency/Duration: Plan Frequency: 2x per week      Time In/Out:   Time In: 1515  Time Out: 1600      PT Visit Info:    Total # of Visits Approved: 99  Progress Note Due Date: 03/15/24  Total # of Visits to Date: 4  Progress Note Counter: 4  Canceled Appointment: 6      Visit Count:  4                OUTPATIENT PHYSICAL THERAPY:             Recertification 2024               Episode (PT - weakness)         Treatment Diagnosis:     Muscle weakness (generalized)  Unsteadiness on feet  Medical/Referring Diagnosis:    Cerebrovascular disease, unspecified    Referring Physician:  Jennifer Garvin FNP MD Orders:  PT Eval and Treat   Return MD Appt:  unknown  Date of Onset:    2023  Allergies:  Atorvastatin  Restrictions/Precautions:    None      Medications Last Reviewed:  2024     SUBJECTIVE   History of Injury/Illness (Reason for Referral):  Mr. Jarrett returns after two more hospitalizations on  through 2024 and  to 2024 for stroke like symptoms.  Reevaluation today is as below with continued weakness and balance deficit.    Mr. Jarrett is a 64 right handed black male who presents to outpatient PT for continued weakness s/p CVA 2023 affecting his R side. This was his 2nd stroke and that he was able to recover from the first one in 3-4 weeks. He denies any history of falls. Pt was seen in OP until 2023. Pt states he has not really been doing any exercises that were given but that things are going ok getting a little stronger.  . He is reffing bball 2 times per

## 2024-04-17 NOTE — THERAPY RECERTIFICATION
Henry Jarrett  : 1960  Primary: [facility] Medicaid (Commercial)  Secondary:  [facility] @ [place] [address]  [facility] [address]  Phone: [phone]  Fax: [phone]    OT Visit Info:  Plan Frequency: 1-2 times/week  Certification Period Expiration Date: 24  Total # of Visits Approved: 0 (see electronic chart)  Total # of Visits to Date: 1  Progress Note Counter: 1  Progress Note Due Date: 24  Canceled Appointment: 2     Visit Count: Visit count could not be calculated. Make sure you are using a visit which is associated with an episode.   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  He states he is having more difficulty with his speech, L UE and walking now.  Walking with a walker.  States he is noticing greater levels of frustration.  Is meeting with neurology soon.     As stated in previous evaluation:  Henry Jarrett returns to outpatient therapy with complaints of B UE weakness.  He states he is taking a half blood pressure pill.  States he doesn't check his blood pressure at home.  Working as a .   As per 23 hospitalist

## 2024-04-19 ENCOUNTER — TELEPHONE (OUTPATIENT)
Dept: NEUROLOGY | Age: 64
End: 2024-04-19

## 2024-04-19 NOTE — PROGRESS NOTES
Outpatient Rehab Services  Referral Form/Physician Order  Central Scheduling Fax: [phone]  Speak to a :  Call [phone]   Please review and co-sign (electronically) OR sign and return to the below indicated clinic's fax number if you agree with this request.  Thank you!      NAME:  Henry Jarrett SSN:  [id number] :  1960   ADDRESS:  [address] [place] [address] Daytime Phone:  [phone] (home)There is no such number on file (mobile).    Diagnosis & Date of Onset:  Weakness [R53.1] Special Precautions:   Frequency:  [] to be determined by therapist after evaluation   OR   ____ X per week X ____ weeks    Services    [] Evaluate & Treat  [x] Special Orders_Modified Barium Swallow Study_   [] Physical Therapy  [] Occupational Therapy   [] Speech Therapy  [x] MBS w/ Speech Therapy    Specialized Programs    [] Aquatic Therapy [] Lymphedema [] Oncology Rehab   [] Balance Rehab [] Parkinson's Program [] Osteoporosis   [] Fibromyalgia [] Motion Analysis [] Spine Rehab   [] Industrial Rehab [] Hand & Upper Extremity [] Sports Injury Rehab    [] Urinary Incontinence     Locations    @ [address], [address]  [place] [id number]  Ph: [phone]  Fax: [phone] @ [address]  [place] [address]  Ph: [phone]  Fax: [phone] @ [address], [address]  [place] [address]  Ph: [phone]  Fax: [phone]        @ [address] [address]  Ph: [phone]  Fax: [phone] @ [facility]  [address] [facility] [address]  [place] [address]   Ph: [phone]  Fax: [phone] @ [place]  [address] [place] [address]  Ph: [phone]  Fax: [phone]        @ [facility]  [address] [place] [address]  Ph: [phone]  Fax: [phone] @ [address] [address]  Ph: [phone]  Fax:

## 2024-04-19 NOTE — TELEPHONE ENCOUNTER
Patient called and wanted to voice his frustration regarding the hospital setting up HFU with [facility] and not with [facility] where he is established with Neurology.  He understands that mistakes happens, but it is difficult for him to get around.

## 2024-04-22 ENCOUNTER — HOSPITAL ENCOUNTER (OUTPATIENT)
Dept: PHYSICAL THERAPY | Age: 64
Setting detail: RECURRING SERIES
Discharge: HOME | End: 2024-04-25
Payer: MEDICAID

## 2024-04-22 DIAGNOSIS — R13.12 DYSPHAGIA, OROPHARYNGEAL PHASE: Primary | ICD-10-CM

## 2024-04-22 PROCEDURE — 97110 THERAPEUTIC EXERCISES: CPT

## 2024-04-22 PROCEDURE — 97130 THER IVNTJ EA ADDL 15 MIN: CPT

## 2024-04-22 PROCEDURE — 97129 THER IVNTJ 1ST 15 MIN: CPT

## 2024-04-22 ASSESSMENT — PAIN SCALES - GENERAL
PAINLEVEL_OUTOF10: 0
PAINLEVEL_OUTOF10: 0

## 2024-04-22 NOTE — PROGRESS NOTES
Henry Jarrett  : 1960  Primary: [facility] Medicaid (Medicaid Managed)  Secondary:  [facility] @ [place] [address]  [facility] [address]  Phone: [phone]  Fax: [phone] Plan Frequency: 2x per week    Plan of Care/Certification Expiration Date: 24        Plan of Care/Certification Expiration Date:  Plan of Care/Certification Expiration Date: 24    Frequency/Duration:   Plan Frequency: 2x per week      Time In/Out:   Time In: 1430  Time Out: 1514      PT Visit Info:    Total # of Visits Approved: 99  Progress Note Due Date: 03/15/24  Total # of Visits to Date: 5  Progress Note Counter: 5  Canceled Appointment: 6      Visit Count:  5    OUTPATIENT PHYSICAL THERAPY:   Treatment Note 2024       Episode  (PT - weakness)               Treatment Diagnosis:    Muscle weakness (generalized)  Unsteadiness on feet  Medical/Referring Diagnosis:    Cerebrovascular disease, unspecified    Referring Physician:  Jennifer Garvin FNP MD Orders:  PT Eval and Treat   Return MD Appt:  unknown   Date of Onset:  No data recorded   Allergies:   Atorvastatin  Restrictions/Precautions:   none      Interventions Planned (Treatment may consist of any combination of the following):     See Assessment Note    Subjective Comments:   Patient just finished Speech and has OT after this appointment.      Initial Pain Level::     0/10    Post Session Pain Level:       0/10  Medications Last Reviewed:  2024  Updated Objective Findings:  None Today  Treatment   THERAPEUTIC ACTIVITY: (0 minutes):    Therapeutic activities with education  improve understanding of POC, need for daily exercise to maintain strength, balance, and coordination.  Required min verbal cues to for understanding.  THERAPEUTIC EXERCISE: (43 minutes):    Exercises per grid below to improve mobility, strength, balance, coordination, and pain relief .  Required minimal visual and verbal cues to

## 2024-04-22 NOTE — PROGRESS NOTES
Henry Jarrett  : 1960  Primary: [facility] Medicaid (Medicaid Managed)  Secondary:  [facility] @ [place] [address]  [facility] [address]  Phone: [phone]  Fax: [phone]    Plan of Care/Certification Expiration Date:  Certification Period Expiration Date: 24      Frequency/Duration:   Plan Frequency: 1-2 times/week      Time In/Out:   Time In: 1515  Time Out: 1600    OT Visit Info:  Plan Frequency: 1-2 times/week  Progress Note Due Date: 24  Total # of Visits Approved: 0 (see electronic chart)  Total # of Visits to Date: 2  Progress Note Counter: 2  Canceled Appointment: 2       Visit Count: Visit count could not be calculated. Make sure you are using a visit which is associated with an episode.   OUTPATIENT OCCUPATIONAL THERAPY: Treatment Note 2024  Episode: (OT: CVA)         Treatment Diagnosis:    No data found  Muscle weakness (generalized)  Unsteadiness on feet  Medical/Referring Diagnosis:   Cerebrovascular disease, unspecified Weakness [R53.1]  Referring Physician:  Jennifer Garvin FNP MD Orders:  OT Eval and Treat   Return MD Appt:     Date of Onset:  Onset Date:  (2024)  Allergies:  Atorvastatin  Restrictions/Precautions:   None      Medications Last Reviewed:  2024     Interventions Planned (Treatment may consist of any combination of the following):  Current Treatment Recommendations: Strengthening; ROM; Neuromuscular re-education; Pain management; Safety education & training; Equipment evaluation, education, & procurement; Modalities; Positioning; Dry needling; Self-Care / ADL; Manual Therapy:  STM; Manual Therapy:  Jt Manip; Coordination training; Other (comment) (orthotic management and training)    See Assessment Note      Subjective Comments:   Patient states he is doing better.  >Initial Pain Level:     0/10  >Post Session Pain Level:     0/10  Medications Last Reviewed:  2024  Updated Objective

## 2024-04-22 NOTE — PROGRESS NOTES
Henry Jarrett  : 1960  Primary: [facility] Medicaid  Secondary:  [facility] @ [place] [address] [facility] [address]  Phone: [phone]  Fax: [phone]    Effective Dates:    2024 to 2024   Frequency/Duration  1-2 time(s) a week for 30-60 days  SLP Visit Info:  No Show: 0  Canceled Appointment: 4 (4/15 hospitalized over weekend)  Total # of Visits to Date: 1        OUTPATIENT SPEECH PATHOLOGY NOTE:Daily Note 2024  Appt Desk   Episode        Treatment Diagnosis:    Dysarthria and anarthria  Cognitive communication deficit  Dysphagia, oropharyngeal phase  Medical/Referring Diagnosis:    Cerebrovascular disease, unspecified   Weakness [R53.1]   Referring Physician:  Jennifer Garvin FNP MD Orders:  Eval and Treat   Allergies:  Atorvastatin  Medications Last Reviewed:  2024  Subjective Comments:  Patient reports he is having difficulty coordinating what he needs for f/u after hospital. States he's supposed to have 30 day holter monitor but is not sure who is supposed to set this up.  Looking in the notes, it says neurology should f/u with him about this. Informed patient   Pain:  Patient does not c/o pain.  Interventions Planned: (Treatment may consist of any combination of the following)        See Assessment Note  GOALS: (Goals have been discussed and agreed upon with patient.)  Short-Term Functional Goals: Time Frame: 4 weeks  Dysphagia Goals  Patient will complete instrumental assessment of swallow function.  Patient will perform alternate liquids/solids, small bites and sips, slow rate of PO intake, and bolus hold trial to improve swallow safety with minimal cues 80% of the time.  Patient will safely ingest diet trials during therapeutic feedings with SLP without overt signs or symptoms of respiratory compromise in efforts to advance diet.      Motor Speech Goals  Patient will learn and implement strategies (over-articulation,

## 2024-04-24 ENCOUNTER — APPOINTMENT (OUTPATIENT)
Dept: PHYSICAL THERAPY | Age: 64
End: 2024-04-24
Payer: MEDICAID

## 2024-04-24 ENCOUNTER — HOSPITAL ENCOUNTER (OUTPATIENT)
Dept: PHYSICAL THERAPY | Age: 64
Setting detail: RECURRING SERIES
End: 2024-04-24
Payer: MEDICAID

## 2024-04-24 NOTE — PROGRESS NOTES
Henry Jarrett  : 1960  Primary: [facility] Medicaid  Secondary:  [facility] @ [place] [address]  [place] [address]  Phone: [phone]  Fax: [phone]    SLP Visit Info:  No Show: 0  Canceled Appointment: 1 ( has to be somewhere else)  Total # of Visits to Date: 1      OUTPATIENT THERAPY: 2024  Episode  Appt Desk        Henry Jarrett cancelled his appointment for today due to  has to be somewhere else .  Will plan to follow up next during next appointment.  Thank you,  SMITA Barlow    Future Appointments   Date Time Provider Department Center   2024  7:00 PM Gwyn Galloway, OT [facility]   2024  7:00 PM Karina Adams, PTA [facility]   2024  7:00 PM Ida Mcghee SLP [facility]   2024  8:00 AM Ida Mcghee SLP [facility]   2024  8:45 AM Gwyn Galloway, OT [facility]   2024  9:30 AM Karina Adams, PTA [facility]   5/3/2024 11:45 AM Gwyn Galloway, OT [facility]   5/3/2024  1:00 PM Karina Adams, PTA [facility]   5/3/2024  1:45 PM Ida Mcghee, SLP [facility]   2024  1:45 PM Gwyn Galloway, OT [facility]   2024  2:30 PM Karina Adams, PTA [facility]   2024  3:15 PM Ida Mcghee, SLP [facility]   5/10/2024  1:00 PM Karina Adams, PTA [facility]   5/10/2024  1:45 PM Gwyn Galloway, OT [facility]   5/15/2024  1:00 PM Enedelia Porter, PT [facility]   5/15/2024  1:45 PM Gwyn Galloway, OT [facility]   5/15/2024  2:30 PM Ida Mcghee, SLP [facility]   2024 11:45 AM Gwyn Galloway, OT [facility]   2024  1:00 PM Karina Adams, PTA [facility]   2024  1:45 PM Ida Mcghee, SLP [facility]   2024  1:00 PM Gwyn Galloway, OT [facility]   2024  1:45 PM Enedelia Porter, PT [facility]   2024  2:30 PM Ida Mcghee, SLP [facility]   2024  1:00 PM Enedelia Porter, PT [facility]

## 2024-04-24 NOTE — PROGRESS NOTES
Henry Jarrett  : 1960  Primary: [facility] Medicaid  Secondary:  [facility] @ [place] [address]  [place] [facility] [address]  Phone: [phone]  Fax: [phone] Plan Frequency: 2x per week    Plan of Care/Certification Expiration Date: 24      PT Visit Info:  Total # of Visits Approved: 99  Total # of Visits to Date: 5  Progress Note Counter: 5  Progress Note Due Date: 03/15/24  No Show: 5  Canceled Appointment: 6         OUTPATIENT PHYSICAL THERAPY 2024     Appt Desk   Episode   MyChart      Mr. Jarrett was a >24 hour cancellation for today's appointment.      Karina Adams PTA    Future Appointments   Date Time Provider Department Center   2024  7:00 PM Gwyn Galloway, OT [facility]   2024  7:00 PM Karina Adams, PTA [facility]   2024  7:00 PM Ida Mcghee SLP [facility]   2024  8:00 AM Ida Mcghee SLP [facility]   2024  8:45 AM Gwyn Galloway, OT [facility]   2024  9:30 AM Karina Adams, PTA [facility]   5/3/2024 11:45 AM Gwyn Galloway, OT [facility]   5/3/2024  1:00 PM Karina Adams, PTA [facility]   5/3/2024  1:45 PM Ida Mcghee, SLP [facility]   2024  1:45 PM Gwyn Galloway, OT [facility]   2024  2:30 PM Karina Adams, PTA [facility]   2024  3:15 PM Ida Mcghee, SLP [facility]   5/10/2024  1:00 PM Karina Adams, PTA [facility]   5/10/2024  1:45 PM Gwyn Galloway, OT [facility]   5/15/2024  1:00 PM Enedelia Porter, PT [facility] S[facility]   5/15/2024  1:45 PM Gwyn Galloway, OT [facility] S[facility]   5/15/2024  2:30 PM Ida Mcghee, SLP [facility]   2024 11:45 AM Gwyn Galloway, OT [facility]   2024  1:00 PM Karina Adams, PTA [facility]   2024  1:45 PM Ida Mcghee, SLP [facility]   2024  1:00 PM Gwyn Galloway, OT [facility]   2024  1:45 PM Enedelia Porter, PT [facility] S[facility]   2024  2:30 PM [place],

## 2024-05-01 ENCOUNTER — HOSPITAL ENCOUNTER (OUTPATIENT)
Dept: PHYSICAL THERAPY | Age: 64
Setting detail: RECURRING SERIES
Discharge: HOME | End: 2024-05-04
Payer: MEDICAID

## 2024-05-01 PROCEDURE — 97110 THERAPEUTIC EXERCISES: CPT

## 2024-05-01 PROCEDURE — 92507 TX SP LANG VOICE COMM INDIV: CPT

## 2024-05-01 ASSESSMENT — PAIN SCALES - GENERAL
PAINLEVEL_OUTOF10: 0
PAINLEVEL_OUTOF10: 0

## 2024-05-01 NOTE — PROGRESS NOTES
Outpatient Rehab Services  Referral Form/Physician Order  Central Scheduling Fax: [phone]  Speak to a :  Call [phone]   Please review and co-sign (electronically) OR sign and return to the below indicated clinic's fax number if you agree with this request.  Thank you!      NAME:  Henry Jarrett SSN:  [id number] :  1960   ADDRESS:  [address] [place] [address] Daytime Phone:  [phone] (home)There is no such number on file (mobile).    Diagnosis & Date of Onset:  Dysphagia, oropharyngeal phase  Cerebrovascular disease, unspecified   Weakness [R53.1] Special Precautions:  Patient with c/o difficulty initiating swallow, occasions of coughing on saliva, coughing with some po.    Frequency:  [] to be determined by therapist after evaluation   OR   ____ X per week X ____ weeks    Services    [] Evaluate & Treat  [x] Special Orders_Modified Barium Swallow Study_   [] Physical Therapy  [] Occupational Therapy   [] Speech Therapy  [x] MBS w/ Speech Therapy    Specialized Programs    [] Aquatic Therapy [] Lymphedema [] Oncology Rehab   [] Balance Rehab [] Parkinson's Program [] Osteoporosis   [] Fibromyalgia [] Motion Analysis [] Spine Rehab   [] Industrial Rehab [] Hand & Upper Extremity [] Sports Injury Rehab    [] Urinary Incontinence     Locations    @ [facility]  [address] [facility]  [place] [address]  Ph: [phone]  Fax: [phone] @ [address]  [place] [address]  Ph: [phone]  Fax: [phone] @ [address], [address]  [place] [address]  Ph: [phone]  Fax: [phone]        @ [address] [address]  Ph: [phone]  Fax: [phone] @ [facility]  [address] [facility] [address]  [place] [address]   Ph: [phone]  Fax: [phone] @ [place] [address] [place] [address]  Ph: [phone]  Fax: [phone]        @ [place]

## 2024-05-01 NOTE — PROGRESS NOTES
coordination, and pain relief .  Required minimal visual and verbal cues to promote proper body alignment, promote proper body posture, promote proper body mechanics, and promote proper body breathing techniques.  Progressed resistance, range, and repetitions as indicated.     Date:  4-22-24 Date:  5-1-24    Activity/Exercise     Pt education     Post tilt bridge X 10 - cues for control  X 20   Cues for hip extension   Sit to stand  X 10  Throughout session   SLR 2 x 10 B X 20 B   Seated - Long arc quads 2 x 10 B 2.5#     Sidelying hip abduction  2 x 10 B   Cues to keep knee extended   Side lying  clams  2 x 10 B  2 x 20 B   Prone plank press     Prone hip extension  2 x 10 B   Nu step  SCIFIT   Level 1  10 minutes  885 steps  SCIFIT   Level 4  10 minutes   855 steps    Incline board  3 x 30 sec hold with knees flexed and extended - UE support  3 x 30 sec hold B with knees flexed and extended   BP  at end of session Feeling well.  139/102  Rested 3 min   138/99  No symptoms   No meds this am   EDUCATION:  Instructed in above exercises and home program.  HEP:  Written HEP given to patient.      Treatment/Session Summary:    Treatment Assessment:   Patient stated he felt good.   BP taken at end of session.  HE did report no meds taken this morning.   Rest breaks as needed.     Communication/Consultation:  None today  Equipment provided today:  None  Recommendations/Intent for next treatment session: Next visit will focus on strengthening HEP that can be performed in [employer] or home environment.     >Total Treatment Billable Duration: 46 minutes   Time In: 0931  Time Out: 1019    Karina Adams PTA         [employer]ure  [employer] Portal  Appt Desk     Future Appointments   Date Time Provider Department Center   5/3/2024 11:45 AM Gwyn Galloway OT [facility]   5/3/2024  1:00 PM Karina Adams PTA [facility]   5/3/2024  1:45 PM Charping, Ida, SLP [facility]   5/8/2024  1:45 PM Gywn Galloway OT [facility]

## 2024-05-01 NOTE — PROGRESS NOTES
Henry Jarrett  : 1960  Primary: [facility] Medicaid  Secondary:  [facility] @ [place] [address] [place] [address]  Phone: [phone]  Fax: [phone]    Effective Dates:    2024 to 2024   Frequency/Duration  1-2 time(s) a week for 30-60 days  SLP Visit Info:  No Show: 0  Canceled Appointment: 1 ( has to be somewhere else)  Total # of Visits to Date: 2        OUTPATIENT SPEECH PATHOLOGY NOTE:Daily Note 2024  Appt Desk   Episode        Treatment Diagnosis:    Dysarthria and anarthria  Cognitive communication deficit  Dysphagia, oropharyngeal phase  Medical/Referring Diagnosis:    Cerebrovascular disease, unspecified   Weakness [R53.1]   Referring Physician:  Jennifer Garvin FNP MD Orders:  Eval and Treat   Allergies:  Atorvastatin  Medications Last Reviewed:  2024  Subjective Comments:  Patient got 30 day monitor yesterday. Reviewing chart, provider has not returned orders for an MBS. Informed patient that I will send it to his neurologist.   Pain:  Patient does not c/o pain.  Interventions Planned: (Treatment may consist of any combination of the following)        See Assessment Note  GOALS: (Goals have been discussed and agreed upon with patient.)  Short-Term Functional Goals: Time Frame: 4 weeks  Dysphagia Goals  Patient will complete instrumental assessment of swallow function.  Patient will perform alternate liquids/solids, small bites and sips, slow rate of PO intake, and bolus hold trial to improve swallow safety with minimal cues 80% of the time.  Patient will safely ingest diet trials during therapeutic feedings with SLP without overt signs or symptoms of respiratory compromise in efforts to advance diet.      Motor Speech Goals  Patient will learn and implement strategies (over-articulation, pacing) to communicate information at the word level with 90% intelligibility.    Patient will implement trained strategies to

## 2024-05-01 NOTE — PROGRESS NOTES
Henry Jarrett  : 1960  Primary: [facility] Medicaid (Medicaid Managed)  Secondary:  [facility] [place] [address]  [facility] [address]  Phone: [phone]  Fax: [phone]    Plan of Care/Certification Expiration Date:  Certification Period Expiration Date: 24      Frequency/Duration:   Plan Frequency: 1-2 times/week      Time In/Out:   Time In: 0845  Time Out: 930    OT Visit Info:  Plan Frequency: 1-2 times/week  Progress Note Due Date: 24  Total # of Visits Approved: 0 (see electronic chart)  Total # of Visits to Date: 3  Progress Note Counter: 3  Canceled Appointment: 2       Visit Count: Visit count could not be calculated. Make sure you are using a visit which is associated with an episode.   OUTPATIENT OCCUPATIONAL THERAPY: Treatment Note 2024  Episode: (OT: CVA)         Treatment Diagnosis:    No data found  Muscle weakness (generalized)  Unsteadiness on feet  Medical/Referring Diagnosis:   Cerebrovascular disease, unspecified Weakness [R53.1]  Referring Physician:  Jennifer Garvin FNP MD Orders:  OT Eval and Treat   Return MD Appt:     Date of Onset:  Onset Date:  (2024)  Allergies:  Atorvastatin  Restrictions/Precautions:   None      Medications Last Reviewed:  2024     Interventions Planned (Treatment may consist of any combination of the following):  Current Treatment Recommendations: Strengthening; ROM; Neuromuscular re-education; Pain management; Safety education & training; Equipment evaluation, education, & procurement; Modalities; Positioning; Dry needling; Self-Care / ADL; Manual Therapy:  STM; Manual Therapy:  Jt Manip; Coordination training; Other (comment) (orthotic management and training)    See Assessment Note      Subjective Comments:   Patient states he is doing better.  >Initial Pain Level:     0/10  >Post Session Pain Level:     0/10  Medications Last Reviewed:  2024  Updated Objective

## 2024-05-03 ENCOUNTER — HOSPITAL ENCOUNTER (OUTPATIENT)
Dept: PHYSICAL THERAPY | Age: 64
Setting detail: RECURRING SERIES
Discharge: HOME | End: 2024-05-06
Payer: MEDICAID

## 2024-05-03 PROCEDURE — 92526 ORAL FUNCTION THERAPY: CPT

## 2024-05-03 PROCEDURE — 97110 THERAPEUTIC EXERCISES: CPT

## 2024-05-03 ASSESSMENT — PAIN SCALES - GENERAL
PAINLEVEL_OUTOF10: 0
PAINLEVEL_OUTOF10: 0

## 2024-05-03 NOTE — PROGRESS NOTES
Henry Jarrett  : 1960  Primary: [facility] Medicaid  Secondary:  [facility] [place] [address] [place] [address]  Phone: [phone]  Fax: [phone]    Effective Dates:    2024 to 2024   Frequency/Duration  1-2 time(s) a week for 30-60 days  SLP Visit Info:  No Show: 0  Canceled Appointment: 1 ( has to be somewhere else)  Total # of Visits to Date: 3        OUTPATIENT SPEECH PATHOLOGY NOTE:Daily Note 5/3/2024  Appt Desk   Episode        Treatment Diagnosis:    Dysarthria and anarthria  Cognitive communication deficit  Dysphagia, oropharyngeal phase  Medical/Referring Diagnosis:    Cerebrovascular disease, unspecified   Weakness [R53.1]   Referring Physician:  Jennifer Garvin FNP MD Orders:  Eval and Treat   Allergies:  Atorvastatin  Medications Last Reviewed:  5/3/2024  Subjective Comments:  Patient got 30 day monitor yesterday. Reviewing chart, provider has not returned orders for an MBS. Informed patient that I will send it to his neurologist.   Pain:  Patient does not c/o pain.  Interventions Planned: (Treatment may consist of any combination of the following)        See Assessment Note  GOALS: (Goals have been discussed and agreed upon with patient.)  Short-Term Functional Goals: Time Frame: 4 weeks  Dysphagia Goals  Patient will complete instrumental assessment of swallow function.  Patient will perform alternate liquids/solids, small bites and sips, slow rate of PO intake, and bolus hold trial to improve swallow safety with minimal cues 80% of the time.  Patient will safely ingest diet trials during therapeutic feedings with SLP without overt signs or symptoms of respiratory compromise in efforts to advance diet.      Motor Speech Goals  Patient will learn and implement strategies (over-articulation, pacing) to communicate information at the word level with 90% intelligibility.    Patient will implement trained strategies to

## 2024-05-03 NOTE — PROGRESS NOTES
Henry Jarrett  : 1960  Primary: [facility] Medicaid (Medicaid Managed)  Secondary:  [facility] [place] [address]  [facility] [address]  Phone: [phone]  Fax: [phone]    Plan of Care/Certification Expiration Date:  Certification Period Expiration Date: 24      Frequency/Duration:   Plan Frequency: 1-2 times/week      Time In/Out:   Time In: 1202  Time Out: 1225    OT Visit Info:  Plan Frequency: 1-2 times/week  Progress Note Due Date: 24  Total # of Visits Approved: 0 (see electronic chart)  Total # of Visits to Date: 4  Progress Note Counter: 4  Canceled Appointment: 2       Visit Count: Visit count could not be calculated. Make sure you are using a visit which is associated with an episode.   OUTPATIENT OCCUPATIONAL THERAPY: Treatment Note 5/3/2024  Episode: (OT: CVA)         Treatment Diagnosis:    No data found  Muscle weakness (generalized)  Unsteadiness on feet  Medical/Referring Diagnosis:   Cerebrovascular disease, unspecified Weakness [R53.1]  Referring Physician:  Jennifer Garvin FNP MD Orders:  OT Eval and Treat   Return MD Appt:     Date of Onset:  Onset Date:  (2024)  Allergies:  Atorvastatin  Restrictions/Precautions:   None      Medications Last Reviewed:  5/3/2024     Interventions Planned (Treatment may consist of any combination of the following):  Current Treatment Recommendations: Strengthening; ROM; Neuromuscular re-education; Pain management; Safety education & training; Equipment evaluation, education, & procurement; Modalities; Positioning; Dry needling; Self-Care / ADL; Manual Therapy:  STM; Manual Therapy:  Jt Manip; Coordination training; Other (comment) (orthotic management and training)    See Assessment Note      Subjective Comments:   Patient states he is planning to go to [place]. Isn't using the walker anymore.  >Initial Pain Level:     0/10  >Post Session Pain Level:     0/10  Medications

## 2024-05-03 NOTE — PROGRESS NOTES
Henry Jarrett  : 1960  Primary: [facility] Medicaid (Medicaid Managed)  Secondary:  [facility] [place] [address]  [facility] [address]  Phone: [phone]  Fax: [phone] Plan Frequency: 2x per week    Plan of Care/Certification Expiration Date: 24        Plan of Care/Certification Expiration Date:  Plan of Care/Certification Expiration Date: 24    Frequency/Duration:   Plan Frequency: 2x per week      Time In/Out:   Time In: 1300  Time Out: 1135  0930    PT Visit Info:    Total # of Visits Approved: 99  Progress Note Due Date: 03/15/24  Total # of Visits to Date: 5  Progress Note Counter: 5  Canceled Appointment: 6      Visit Count:  7    OUTPATIENT PHYSICAL THERAPY:   Treatment Note 5/3/2024       Episode  (PT - weakness)               Treatment Diagnosis:    Muscle weakness (generalized)  Unsteadiness on feet  Medical/Referring Diagnosis:    Cerebrovascular disease, unspecified    Referring Physician:  Jennifer Garvin FNP MD Orders:  PT Eval and Treat   Return MD Appt:  unknown   Date of Onset:  No data recorded   Allergies:   Atorvastatin  Restrictions/Precautions:   none      Interventions Planned (Treatment may consist of any combination of the following):     See Assessment Note    Subjective Comments:   Patient stated he did take all of his medicines this morning.  He reports feeling a tad tired today.  Still has monitor donned.      Initial Pain Level::     0/10    Post Session Pain Level:       0/10  Medications Last Reviewed:  5/3/2024  Updated Objective Findings:  None Today  Treatment   THERAPEUTIC ACTIVITY: (0 minutes):    Therapeutic activities with education  improve understanding of POC, need for daily exercise to maintain strength, balance, and coordination.  Required min verbal cues to for understanding.  THERAPEUTIC EXERCISE: (45 minutes):    Exercises per grid below to improve mobility, strength, balance, coordination,

## 2024-05-07 NOTE — PROGRESS NOTES
Henry Jarrett  : 1960  Primary: [facility] Medicaid  Secondary:  [facility] [place] [address] [place] [address]  Phone: [phone]  Fax: [phone]    Effective Dates:    2024 to 2024   Frequency/Duration  1-2 time(s) a week for 30-60 days  SLP Visit Info:  No Show: 0  Canceled Appointment: 1 ( has to be somewhere else)  Total # of Visits to Date: 4        OUTPATIENT SPEECH PATHOLOGY NOTE:Daily Note 2024  Appt Desk   Episode        Treatment Diagnosis:    Dysarthria and anarthria  Cognitive communication deficit  Dysphagia, oropharyngeal phase  Medical/Referring Diagnosis:    Cerebrovascular disease, unspecified   Weakness [R53.1]   Referring Physician:  Jennifer Garvin FNP MD Orders:  Eval and Treat   Allergies:  Atorvastatin  Medications Last Reviewed:  2024  Subjective Comments:  Patient reports no pertinent updates.   Pain:  Patient does not c/o pain.  Interventions Planned: (Treatment may consist of any combination of the following)        See Assessment Note  GOALS: (Goals have been discussed and agreed upon with patient.)  Short-Term Functional Goals: Time Frame: 4 weeks  Dysphagia Goals  Patient will complete instrumental assessment of swallow function.  Patient will perform alternate liquids/solids, small bites and sips, slow rate of PO intake, and bolus hold trial to improve swallow safety with minimal cues 80% of the time.  Patient will safely ingest diet trials during therapeutic feedings with SLP without overt signs or symptoms of respiratory compromise in efforts to advance diet.      Motor Speech Goals  Patient will learn and implement strategies (over-articulation, pacing) to communicate information at the word level with 90% intelligibility.    Patient will implement trained strategies to read aloud functional phrases with correct articulation given no more than 2 cues for use of strategies

## 2024-05-08 ENCOUNTER — HOSPITAL ENCOUNTER (OUTPATIENT)
Dept: PHYSICAL THERAPY | Age: 64
Setting detail: RECURRING SERIES
Discharge: HOME | End: 2024-05-11
Payer: MEDICAID

## 2024-05-08 PROCEDURE — 92526 ORAL FUNCTION THERAPY: CPT

## 2024-05-08 PROCEDURE — 97110 THERAPEUTIC EXERCISES: CPT

## 2024-05-08 NOTE — PROGRESS NOTES
Henry Jarrett  : 1960  Primary: [facility] Medicaid (Medicaid Managed)  Secondary:  [facility] @ [place] [address]   [facility] [address]  Phone: [phone]  Fax: [phone]    Plan of Care/Certification Expiration Date:  Certification Period Expiration Date: 24      Frequency/Duration:   Plan Frequency: 1-2 times/week      Time In/Out:   Time In: 1348  Time Out: 1430    OT Visit Info:  Plan Frequency: 1-2 times/week  Progress Note Due Date: 24  Total # of Visits Approved: 0 (see electronic chart)  Total # of Visits to Date: 4  Progress Note Counter: 4  Canceled Appointment: 2       Visit Count: Visit count could not be calculated. Make sure you are using a visit which is associated with an episode.   OUTPATIENT OCCUPATIONAL THERAPY: Treatment Note 2024  Episode: (OT: CVA)         Treatment Diagnosis:    No data found  Muscle weakness (generalized)  Unsteadiness on feet  Medical/Referring Diagnosis:   Cerebrovascular disease, unspecified Weakness [R53.1]  Referring Physician:  Jennifer Garvin FNP MD Orders:  OT Eval and Treat   Return MD Appt:     Date of Onset:  Onset Date:  (2024)  Allergies:  Atorvastatin  Restrictions/Precautions:   None      Medications Last Reviewed:  2024     Interventions Planned (Treatment may consist of any combination of the following):  Current Treatment Recommendations: Strengthening; ROM; Neuromuscular re-education; Pain management; Safety education & training; Equipment evaluation, education, & procurement; Modalities; Positioning; Dry needling; Self-Care / ADL; Manual Therapy:  STM; Manual Therapy:  Jt Manip; Coordination training; Other (comment) (orthotic management and training)    See Assessment Note      Subjective Comments:   Patient states his L thumb is bothering him a little bit.  >Initial Pain Level:      (\"L thumb\" a little bit)/10  >Post Session Pain Level:

## 2024-05-08 NOTE — PROGRESS NOTES
Henry Jarrett  : 1960  Primary: [facility] Medicaid (Medicaid Managed)  Secondary:  [facility] [place] [address] [place]  [facility] [address]  Phone: [phone]  Fax: [phone] Plan Frequency: 2x per week    Plan of Care/Certification Expiration Date: 24        Plan of Care/Certification Expiration Date:  Plan of Care/Certification Expiration Date: 24    Frequency/Duration:   Plan Frequency: 2x per week      Time In/Out:   Time In: 1430  Time Out: 1515      PT Visit Info:    Total # of Visits Approved: 99  Progress Note Due Date: 03/15/24  Total # of Visits to Date: 8  Progress Note Counter: 8  Canceled Appointment: 6      Visit Count:  8    OUTPATIENT PHYSICAL THERAPY:   Treatment Note 2024       Episode  (PT - weakness)               Treatment Diagnosis:    Muscle weakness (generalized)  Unsteadiness on feet  Medical/Referring Diagnosis:    Cerebrovascular disease, unspecified    Referring Physician:  Jennifer Garvin FNP MD Orders:  PT Eval and Treat   Return MD Appt:  unknown   Date of Onset:  No data recorded   Allergies:   Atorvastatin  Restrictions/Precautions:   none      Interventions Planned (Treatment may consist of any combination of the following):     See Assessment Note    Subjective Comments:   Patient stated he is doing better at getting all of his medicines before coming to Therapy.     Initial Pain Level::      (low back achy)/10    Post Session Pain Level:        (same)/10  Medications Last Reviewed:  2024  Updated Objective Findings:  None Today  Treatment   THERAPEUTIC ACTIVITY: (0 minutes):    Therapeutic activities with education  improve understanding of POC, need for daily exercise to maintain strength, balance, and coordination.  Required min verbal cues to for understanding.  THERAPEUTIC EXERCISE: (44 minutes):    Exercises per grid below to improve mobility, strength, balance, coordination, and pain relief .

## 2024-05-10 ENCOUNTER — HOSPITAL ENCOUNTER (OUTPATIENT)
Dept: PHYSICAL THERAPY | Age: 64
Setting detail: RECURRING SERIES
Discharge: HOME | End: 2024-05-13
Payer: MEDICAID

## 2024-05-10 PROCEDURE — 97110 THERAPEUTIC EXERCISES: CPT

## 2024-05-10 ASSESSMENT — PAIN SCALES - GENERAL
PAINLEVEL_OUTOF10: 0
PAINLEVEL_OUTOF10: 0

## 2024-05-10 NOTE — PROGRESS NOTES
Henry Jarrett  : 1960  Primary: [facility] Medicaid (Medicaid Managed)  Secondary:  [facility] [place] [address]  [facility] [address]  Phone: [phone]  Fax: [phone] Plan Frequency: 2x per week    Plan of Care/Certification Expiration Date: 24        Plan of Care/Certification Expiration Date:  Plan of Care/Certification Expiration Date: 24    Frequency/Duration:   Plan Frequency: 2x per week      Time In/Out:   Time In: 1300  Time Out: 1345  1300    PT Visit Info:    Total # of Visits Approved: 99  Progress Note Due Date: 03/15/24  Total # of Visits to Date: 9  Progress Note Counter: 9  Canceled Appointment: 6      Visit Count:  9    OUTPATIENT PHYSICAL THERAPY:   Treatment Note 5/10/2024       Episode  (PT - weakness)               Treatment Diagnosis:    Muscle weakness (generalized)  Unsteadiness on feet  Medical/Referring Diagnosis:    Cerebrovascular disease, unspecified    Referring Physician:  Jennifer Garvin FNP MD Orders:  PT Eval and Treat   Return MD Appt:  unknown   Date of Onset:  No data recorded   Allergies:   Atorvastatin  Restrictions/Precautions:   none      Interventions Planned (Treatment may consist of any combination of the following):     See Assessment Note    Subjective Comments:   Patient stated he just feels sluggish today.      Initial Pain Level::     0/10    Post Session Pain Level:       0/10  Medications Last Reviewed:  5/10/2024  Updated Objective Findings:  None Today  Treatment   THERAPEUTIC ACTIVITY: (0 minutes):    Therapeutic activities with education  improve understanding of POC, need for daily exercise to maintain strength, balance, and coordination.  Required min verbal cues to for understanding.  THERAPEUTIC EXERCISE: (44 minutes):    Exercises per grid below to improve mobility, strength, balance, coordination, and pain relief .  Required minimal visual and verbal cues to promote proper body

## 2024-05-10 NOTE — PROGRESS NOTES
Henry Jarrett  : 1960  Primary: [facility] Medicaid (Medicaid Managed)  Secondary:  [facility] [place] [address]  [facility] [address]  Phone: [phone]  Fax: [phone]    Plan of Care/Certification Expiration Date:  Certification Period Expiration Date: 24      Frequency/Duration:   Plan Frequency: 1-2 times/week      Time In/Out:   Time In: 1346  Time Out: 1416    OT Visit Info:  Plan Frequency: 1-2 times/week  Progress Note Due Date: 24  Total # of Visits Approved: 0 (see electronic chart)  Total # of Visits to Date: 5  Progress Note Counter: 5  Canceled Appointment: 2       Visit Count: Visit count could not be calculated. Make sure you are using a visit which is associated with an episode.   OUTPATIENT OCCUPATIONAL THERAPY: Treatment Note 5/10/2024  Episode: (OT: CVA)         Treatment Diagnosis:    No data found  Muscle weakness (generalized)  Unsteadiness on feet  Medical/Referring Diagnosis:   Cerebrovascular disease, unspecified Weakness [R53.1]  Referring Physician:  Jennifer Garvin FNP MD Orders:  OT Eval and Treat   Return MD Appt:     Date of Onset:  Onset Date:  (2024)  Allergies:  Atorvastatin  Restrictions/Precautions:   None      Medications Last Reviewed:  5/10/2024     Interventions Planned (Treatment may consist of any combination of the following):  Current Treatment Recommendations: Strengthening; ROM; Neuromuscular re-education; Pain management; Safety education & training; Equipment evaluation, education, & procurement; Modalities; Positioning; Dry needling; Self-Care / ADL; Manual Therapy:  STM; Manual Therapy:  Jt Manip; Coordination training; Other (comment) (orthotic management and training)    See Assessment Note      Subjective Comments:   Patient states he feels like he is getting a little stronger.  >Initial Pain Level:     0/10  >Post Session Pain Level:     0/10  Medications Last Reviewed:

## 2024-05-15 ENCOUNTER — HOSPITAL ENCOUNTER (OUTPATIENT)
Dept: PHYSICAL THERAPY | Age: 64
Setting detail: RECURRING SERIES
End: 2024-05-15
Payer: MEDICAID

## 2024-05-15 NOTE — PROGRESS NOTES
Henry Jarrett  : 1960  Primary: [facility] Medicaid  Secondary:  [facility] @ [place] [address]  [place] [address]  Phone: [phone]  Fax: [phone] Plan Frequency: 2x per week    Plan of Care/Certification Expiration Date: 24      PT Visit Info:  Total # of Visits Approved: 99  Total # of Visits to Date: 9  Progress Note Counter: 9  Progress Note Due Date: 03/15/24  No Show: 5  Canceled Appointment: 7         OUTPATIENT PHYSICAL THERAPY 5/15/2024     Appt Desk   Episode   MyChart      Mr. Jarrett was a same-day cancellation for today's appointment. Not feeling well    Cancellation Number: [id number]       Enedelia Porter, PT    Future Appointments   Date Time Provider Department Center   5/15/2024  1:45 PM Gwyn Galloway, OT [facility]   5/15/2024  2:30 PM Ida Mcghee, SLP [facility]   2024  7:00 PM Gwyn Galloway, OT [facility]   2024  7:00 PM Karina Adams, PTA [facility]   2024  7:00 PM Ida Mcghee, SLP [facility]   2024  7:00 PM Gwyn Galloway, OT [facility]   2024  7:00 PM Enedelia Porter, PT [facility]   2024  7:00 PM Ida Mcghee, SLP [facility]   2024  7:00 PM Gwyn Galloway, OT [facility]   2024  7:00 PM Enedelia Porter, PT [facility]   2024  7:00 PM Ida Mcghee, SLP [facility]   2024  1:00 PM Enedelia Porter, PT [facility]   2024  1:45 PM Gwyn Galloway, OT [facility]   2024  2:30 PM CharIda peterson SLP [facility]   2024 11:45 AM Gwyn Galloway OT [facility]   2024  1:00 PM Karina Adams PTA [facility]   2024  1:45 PM Ida Mcghee SLP [facility]

## 2024-05-15 NOTE — PROGRESS NOTES
Henry Jarrett  : 1960  Primary: [facility] Medicaid  Secondary:  [facility] @ [place] [address]  [facility] [address]  Phone: [phone]  Fax: [phone] Plan Frequency: 2x per week    Plan of Care/Certification Expiration Date: 24      OT Visit Info:  Total # of Visits Approved: 0 (see electronic chart)  Total # of Visits to Date: 6  Progress Note Counter: 6  Progress Note Due Date: 24  Canceled Appointment: 2      OUTPATIENT OCCUPATIONAL THERAPY 5/15/2024     Appt Desk   Episode   MyChart      Mr. Jarrett was a same-day cancellation for today's appointment due to being in the ER recently and not feeling well.    Cancellation Number:   3    Gwyn Galloway OT    Future Appointments   Date Time Provider Department Center   5/15/2024  2:30 PM Ida Mcghee SLP [facility]   2024  7:00 PM Gwyn Galloway, OT [facility]   2024  7:00 PM Karina Adams, PTA [facility]   2024  7:00 PM Ida Mcghee, SLP [facility]   2024  7:00 PM Gwyn Galloway, OT [facility]   2024  7:00 PM Enedelia Porter, PT [facility]   2024  7:00 PM Ida Mcghee, SLP [facility]   2024  7:00 PM Gwyn Galloway, OT [facility]   2024  7:00 PM Enedelia Porter, PT [facility]   2024  7:00 PM Ida Mcghee, SLP [facility]   2024  1:00 PM Enedelia Porter, PT [facility]   2024  1:45 PM Gwyn Galloway, OT [facility]   2024  2:30 PM Ida Mcghee, SLP [facility]   2024 11:45 AM Gwyn Galloway OT [facility] D   2024  1:00 PM Karina Adams PTA [facility] D   2024  1:45 PM Ida Mcghee, SLP DORPT D

## 2024-05-15 NOTE — PROGRESS NOTES
Henry Jarrett  : 1960  Primary: [facility] Medicaid  Secondary:  [facility] @ [place] [address]  [place] [address]  Phone: [phone]  Fax: [phone]    SLP Visit Info:   No Show: 0  Canceled Appointment: 2 (5/15 at ER yesterday, not feeling well)  Total # of Visits to Date: 4      OUTPATIENT THERAPY:SPEECH THERAPY NOTE               Episode  Appt Desk           Henry Jarrett cancelled his appointment for today due to  at ER yesterday, not feeling well .  Will plan to follow up next during next appointment.  Thank you,  SMITA Barlow    Future Appointments   Date Time Provider Department Center   5/15/2024  1:00 PM Enedelia Porter, PT [facility]   5/15/2024  1:45 PM Gwyn Galloway, OT [facility]   5/15/2024  2:30 PM Ida Mcghee SLP [facility]   2024  7:00 PM Gwyn Galloway, OT [facility]   2024  7:00 PM Karina Adams, PTA [facility]   2024  7:00 PM Ida Mcghee, SLP [facility]   2024  7:00 PM Gwyn Galloway, OT [facility]   2024  7:00 PM Enedelia Porter, PT [facility]   2024  7:00 PM Ida Mcghee, SLP [facility] S[facility]   2024  7:00 PM Gwyn Galloway, OT [facility] S[facility]   2024  7:00 PM Enedelia Porter, PT [facility] S[facility]   2024  7:00 PM Ida Mcghee, SLP [facility]   2024  1:00 PM Enedelia Porter, PT [facility] S[facility]   2024  1:45 PM Gwyn Galloway, OT [facility] S[facility]   2024  2:30 PM Ida Mcghee, SLP [facility] SFD   2024 11:45 AM Gwyn Galloway OT SFD[facility] D   2024  1:00 PM Karina Adams PTA S[facility] DIOR   2024  1:45 PM Ida Mcghee SLP SFDORPNANCY D

## 2024-05-17 ENCOUNTER — HOSPITAL ENCOUNTER (OUTPATIENT)
Dept: PHYSICAL THERAPY | Age: 64
Setting detail: RECURRING SERIES
End: 2024-05-17
Payer: MEDICAID

## 2024-05-17 ENCOUNTER — APPOINTMENT (OUTPATIENT)
Dept: PHYSICAL THERAPY | Age: 64
End: 2024-05-17
Payer: MEDICAID

## 2024-05-17 NOTE — PROGRESS NOTES
Henry Jarrett  : 1960  Primary: [facility] Medicaid  Secondary:  [facility] @ [place] [address]  [facility] [address]  Phone: [phone]  Fax: [phone] Plan Frequency: 2x per week    Plan of Care/Certification Expiration Date: 24      OT Visit Info:  Total # of Visits Approved: 0 (see electronic chart)  Total # of Visits to Date: 6  Progress Note Counter: 6  Progress Note Due Date: 24  Canceled Appointment: 2      OUTPATIENT OCCUPATIONAL THERAPY 2024     Appt Desk   Episode   MyChart      Mr. Jarrett was a >24 hour cancellation for today's appointment.    Gwyn Galloway OT    Future Appointments   Date Time Provider Department Center   2024  7:00 PM Gwyn Galloway, OT [facility]   2024  7:00 PM Karina Adams, PTA [facility]   2024  7:00 PM Ida Mcghee, SLP [facility] S[facility]   2024  7:00 PM Gwyn Galloway, OT [facility] S[facility]   2024  7:00 PM Enedelia Porter, PT [facility] S[facility]   2024  7:00 PM Ida Mcghee, SLP [facility] SFD   2024  7:00 PM Gwyn Galloway, OT [facility] SFD   2024  7:00 PM Enedelia Porter, PT [facility] S[facility]   2024  7:00 PM Ida Mcghee, SLP [facility] SFD   2024  1:00 PM Enedelia Porter, PT [facility] SFD   2024  1:45 PM Gwyn Galloway, OT [facility] S[facility]   2024  2:30 PM Ida Mcghee, SLP [facility] S[facility]   2024 11:45 AM Gwyn Galloway, OT [facility] SFD   2024  1:00 PM Karina Adams, PTA [facility] S[facility]   2024  1:45 PM Ida Mcghee, SLP [facility] SFD

## 2024-05-17 NOTE — PROGRESS NOTES
Henry Jarrett  : 1960  Primary: [facility] Medicaid  Secondary:  [facility] @ [place] [address]  [facility] [address]  Phone: [phone]  Fax: [phone] Plan Frequency: 2x per week    Plan of Care/Certification Expiration Date: 24      PT Visit Info:  Total # of Visits Approved: 99  Total # of Visits to Date: 9  Progress Note Counter: 9  Progress Note Due Date: 03/15/24  No Show: 5  Canceled Appointment: 7         OUTPATIENT PHYSICAL THERAPY 2024     Appt Desk   Episode   MyChart      Mr. Jarrett was a >24 hour cancellation for today's appointment.      Karina Adams PTA    Future Appointments   Date Time Provider Department Center   2024  7:00 PM Gwyn Galloway, OT [facility]   2024  7:00 PM Karina Adams PTA [facility] S[facility]   2024  7:00 PM Ida Mcghee, SLP [facility] S[facility]   2024  7:00 PM Gwyn Galloway, OT [facility] S[facility]   2024  7:00 PM Enedelia Porter, PT [facility] S[facility]   2024  7:00 PM Ida Mcghee, SLP [facility] S[facility]   2024  7:00 PM Gwyn Galloway, OT [facility] S[facility]   2024  7:00 PM Enedelia Porter, PT [facility] S[facility]   2024  7:00 PM Ida Mcghee, SLP [facility] S[facility]   2024  1:00 PM Enedelia Porter, PT [facility] S[facility]   2024  1:45 PM Gwyn Galloway, OT [facility] S[facility]   2024  2:30 PM Ida Mcghee, SLP [facility] S[facility]   2024 11:45 AM Gwyn Galloway, OT [facility] S[facility]   2024  1:00 PM Karina Adams, PTA [facility] S[facility]   2024  1:45 PM Ida Mcghee, SLP [facility] S[facility]

## 2024-05-20 ENCOUNTER — HOSPITAL ENCOUNTER (OUTPATIENT)
Dept: PHYSICAL THERAPY | Age: 64
Setting detail: RECURRING SERIES
End: 2024-05-20
Payer: MEDICAID

## 2024-05-20 ENCOUNTER — APPOINTMENT (OUTPATIENT)
Dept: PHYSICAL THERAPY | Age: 64
End: 2024-05-20
Payer: MEDICAID

## 2024-05-22 ENCOUNTER — HOSPITAL ENCOUNTER (OUTPATIENT)
Dept: PHYSICAL THERAPY | Age: 64
Setting detail: RECURRING SERIES
End: 2024-05-22
Payer: MEDICAID

## 2024-05-22 ENCOUNTER — APPOINTMENT (OUTPATIENT)
Dept: PHYSICAL THERAPY | Age: 64
End: 2024-05-22
Payer: MEDICAID

## 2024-05-29 ENCOUNTER — HOSPITAL ENCOUNTER (OUTPATIENT)
Dept: PHYSICAL THERAPY | Age: 64
Setting detail: RECURRING SERIES
Discharge: HOME | End: 2024-06-01
Payer: MEDICAID

## 2024-05-29 PROCEDURE — 97110 THERAPEUTIC EXERCISES: CPT

## 2024-05-29 PROCEDURE — 92526 ORAL FUNCTION THERAPY: CPT

## 2024-05-29 ASSESSMENT — PAIN SCALES - GENERAL: PAINLEVEL_OUTOF10: 0

## 2024-05-29 NOTE — PROGRESS NOTES
Henry Jarrett  : 1960  Primary: [facility] Medicaid (Commercial)  Secondary:  [facility] @ [place] [address]  [facility] [address]  Phone: [phone]  Fax: [phone]    OT Visit Info:  Plan Frequency: 1-2 times/week  Certification Period Expiration Date: 24  Total # of Visits Approved: 0 (see electronic chart)  Total # of Visits to Date: 6  Progress Note Counter: 6  Progress Note Due Date: 24  Canceled Appointment: 2     Visit Count: Visit count could not be calculated. Make sure you are using a visit which is associated with an episode.   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  He states he is having more difficulty with his speech, L UE and walking now.  Walking with a walker.  States he is noticing greater levels of frustration.  Is meeting with neurology soon.     As stated in previous evaluation:  Henry Jarrett returns to outpatient therapy with complaints of B UE weakness.  He states he is taking a half blood pressure pill.  States he doesn't check his blood pressure at home.  Working as a .   As per 23 hospitalist

## 2024-05-29 NOTE — PROGRESS NOTES
Henry Jarrett  : 1960  Primary: [facility] Medicaid  Secondary:  [facility] @ [place] [address] [facility] [facility] [address]  Phone: [phone]  Fax: [phone]    Effective Dates:    2024 to 2024   Frequency/Duration  1-2 time(s) a week for 30-60 days  SLP Visit Info:  No Show: 0  Canceled Appointment: 3 (, out of town)  Total # of Visits to Date: 5        OUTPATIENT SPEECH PATHOLOGY NOTE:Daily Note 2024  Appt Desk   Episode        Treatment Diagnosis:    Dysarthria and anarthria  Cognitive communication deficit  Dysphagia, oropharyngeal phase  Medical/Referring Diagnosis:    Cerebrovascular disease, unspecified   Weakness [R53.1]   Referring Physician:  Jennifer aGrvin FNP MD Orders:  Eval and Treat   Allergies:  Atorvastatin  Medications Last Reviewed:  2024  Subjective Comments:  Patient reports  swallowing has improved. States he can gargle now without getting strangled. Still has not received orders for MBS. We have attempted to fax request for orders with 2 of patient's providers. Pt in agreement that we can request MBS orders when he sets up with new PCP on .   Pain:  Patient does not c/o pain.  Interventions Planned: (Treatment may consist of any combination of the following)        See Assessment Note  GOALS: (Goals have been discussed and agreed upon with patient.)  Short-Term Functional Goals: Time Frame: 4 weeks  Dysphagia Goals  Patient will complete instrumental assessment of swallow function.  Patient will perform alternate liquids/solids, small bites and sips, slow rate of PO intake, and bolus hold trial to improve swallow safety with minimal cues 80% of the time.  Patient will safely ingest diet trials during therapeutic feedings with SLP without overt signs or symptoms of respiratory compromise in efforts to advance diet.      Motor Speech Goals  Patient will learn and implement strategies

## 2024-05-29 NOTE — PROGRESS NOTES
Henry Jarrett  : 1960  Primary: [facility] Medicaid (Medicaid Managed)  Secondary:  [facility] @ [place] [address]  [facility] [address]  Phone: [phone]  Fax: [phone]    Plan of Care/Certification Expiration Date:  Certification Period Expiration Date: 24      Frequency/Duration:   Plan Frequency: 1-2 times/week      Time In/Out:   Time In: 1345  Time Out: 1430    OT Visit Info:  Plan Frequency: 1-2 times/week  Progress Note Due Date: 24  Total # of Visits Approved: 0 (see electronic chart)  Total # of Visits to Date: 6  Progress Note Counter: 6  Canceled Appointment: 2       Visit Count: Visit count could not be calculated. Make sure you are using a visit which is associated with an episode.   OUTPATIENT OCCUPATIONAL THERAPY: Treatment Note 2024  Episode: (OT: CVA)         Treatment Diagnosis:    No data found  Muscle weakness (generalized)  Unsteadiness on feet  Medical/Referring Diagnosis:   Cerebrovascular disease, unspecified Weakness [R53.1]  Referring Physician:  Jennifer Garvin FNP MD Orders:  OT Eval and Treat   Return MD Appt:     Date of Onset:  Onset Date:  (2024)  Allergies:  Atorvastatin  Restrictions/Precautions:   None      Medications Last Reviewed:  2024     Interventions Planned (Treatment may consist of any combination of the following):  Current Treatment Recommendations: Strengthening; ROM; Neuromuscular re-education; Pain management; Safety education & training; Equipment evaluation, education, & procurement; Modalities; Positioning; Dry needling; Self-Care / ADL; Manual Therapy:  STM; Manual Therapy:  Jt Manip; Coordination training; Other (comment) (orthotic management and training)    See Assessment Note      Subjective Comments:   Patient states he was in the ER last week and now his daughter is in the hospital.  >Initial Pain Level:     0/10  >Post Session Pain Level:     0/10  Medications

## 2024-05-29 NOTE — PROGRESS NOTES
minimal visual and verbal cues to promote proper body alignment, promote proper body posture, promote proper body mechanics, and promote proper body breathing techniques.  Progressed resistance, range, and repetitions as indicated.     Date:  4-22-24 Date:  5-1-24  Date:  5-3-24 Date:  5-8-24 Date:  5-10-24 Date:  5-29-24   Activity/Exercise         Pt education         Post tilt bridge X 10 - cues for control  X 20   Cues for hip extension       Sit to stand  X 10  Throughout session       SLR 2 x 10 B X 20 B       Seated - Long arc quads 2 x 10 B 2.5#         Sidelying hip abduction  2 x 10 B   Cues to keep knee extended Standing -   X 20 B  Standing -   Each foot on green oval foam   With opposite leg moving   X 20 B  Standing -   Each foot on foam pad and move opposite leg   X 20 B     Side lying  clams  2 x 10 B  2 x 20 B       Prone plank press         Prone hip extension  2 x 10 B  Standing -   Each foot on green oval foam   With opposite leg moving   X 20 B  Standing -   Each foot on foam pad and move opposite leg   X 20 B  Standing -   X 25 B    Nu step  SCIFIT   Level 1  10 minutes  885 steps  SCIFIT   Level 4  10 minutes   855 steps  SCIFIT   Level 4  10 minutes   832 steps  SCIFIT   Level 2  10 minutes   955 steps  SCIFIT   Level 4  10 minutes  904 steps   SCIFIT  Level 2  10 minutes   977   Incline board  3 x 30 sec hold with knees flexed and extended - UE support  3 x 30 sec hold B with knees flexed and extended 3 x 30 sec hold with knees flexed and extended  3 x 30 sec hold with knees flexed and extended  3 x 30 sec hold with knees flexed and extended  3 x 30 sec hold with knees flexed and extended   BP  at end of session Feeling well.  139/102  Rested 3 min   138/99  No symptoms   No meds this am       Rocker board    A/P  & M/L   Tapping x 20   Static hold 30 sec  A/P  & M/L   Tapping x 25   Static hold 2 x 30 sec A/P  &  M/L  Tapping x 25  A/P  &  M/P   Tapping x 25    Leg press    25#      x 10

## 2024-05-31 ENCOUNTER — HOSPITAL ENCOUNTER (OUTPATIENT)
Dept: PHYSICAL THERAPY | Age: 64
Setting detail: RECURRING SERIES
End: 2024-05-31
Payer: MEDICAID

## 2024-05-31 PROCEDURE — 97110 THERAPEUTIC EXERCISES: CPT

## 2024-05-31 PROCEDURE — 97129 THER IVNTJ 1ST 15 MIN: CPT

## 2024-05-31 PROCEDURE — 97130 THER IVNTJ EA ADDL 15 MIN: CPT

## 2024-05-31 ASSESSMENT — PAIN SCALES - GENERAL
PAINLEVEL_OUTOF10: 0

## 2024-05-31 NOTE — PROGRESS NOTES
no more than 2 cues for use of strategies (over-articulation, pacing).  Patient will learn and implement strategies (over-articulation, pacing) to communicate information at the sentence level with 80% intelligibility.   Patient will participate in 3 minute conversation with no more than 5 cues for use of strategies (over-articulation, pacing) to communicate effectively.   Patient will report at least one situation in which trained strategies were utilized outside of therapy to improve communication each therapy session.      Cognitive-communication Goals  Patient will use external memory aid with 80% accuracy with verbal and tactile cues to maximize memory skills.   Patient will demonstrate recall of functional information (ex: with use of planner/memory journal/to do list) with min cues with 80% accuracy, in order to increase functional integration into environment.  Discharge Goals: Time Frame: 8 weeks  Patient will develop functional and intelligible speech and utilize compensatory strategies through the use of increased articulatory precision and speech prosody for effective communication.   Patient will utilize a memory management system within the home environment with min amount of support for improved day-to-day continuity and increased self-reliance.   Patient will maintain adequate hydration/nutrition with optimum safety and efficiency of swallowing function with PO intake without overt signs or symptoms of aspiration for the highest appropriate diet level.    Updated Objective Findings:  None Today  Treatment   Dysphagia  Effortful swallow: --  Oly: --  Mendelsohn maneuver: --  Tongue back and hold --    Motor speech   Utilized strategies (over-articulation, pacing) at conversational level with 90% accuracy min cues    Cognitive-Linguistic  Verbal and written education given for WRAP strategy as external memory aid strategy - Write it down, repeat it, associate it, picture it  Patient able to use

## 2024-05-31 NOTE — PROGRESS NOTES
Henry Jarrett  : 1960  Primary: [facility] Medicaid (Medicaid Managed)  Secondary:  [facility] @ [place] [address]  [facility] [address]  Phone: [phone]  Fax: [phone] Plan Frequency: 2x per week    Plan of Care/Certification Expiration Date: 24        Plan of Care/Certification Expiration Date:  Plan of Care/Certification Expiration Date: 24    Frequency/Duration:   Plan Frequency: 2x per week      Time In/Out:   Time In: 1301  Time Out: 1345      PT Visit Info:    Total # of Visits Approved: 99  Progress Note Due Date: 03/15/24  Total # of Visits to Date: 11  Progress Note Counter: 7  Canceled Appointment: 7      Visit Count:  11    OUTPATIENT PHYSICAL THERAPY:   Treatment Note 2024       Episode  (PT - weakness)               Treatment Diagnosis:    Muscle weakness (generalized)  Unsteadiness on feet  Medical/Referring Diagnosis:    Cerebrovascular disease, unspecified    Referring Physician:  Jennifer Garvin FNP MD Orders:  PT Eval and Treat   Return MD Appt:  unknown   Date of Onset:  No data recorded   Allergies:   Atorvastatin  Restrictions/Precautions:   none      Interventions Planned (Treatment may consist of any combination of the following):     See Assessment Note    Subjective Comments:   Patient states today is a better day.  He does not feel as weak or drained today.  HE reports he will [facility] with his [facility].      Initial Pain Level::     0/10    Post Session Pain Level:       0/10  Medications Last Reviewed:  2024  Updated Objective Findings:  None Today  Treatment   THERAPEUTIC ACTIVITY: (0 minutes):    Therapeutic activities with education  improve understanding of POC, need for daily exercise to maintain strength, balance, and coordination.  Required min verbal cues to for understanding.  THERAPEUTIC EXERCISE: (43 minutes):    Exercises per grid below to improve mobility, strength,

## 2024-05-31 NOTE — PROGRESS NOTES
Henry Jarrett  : 1960  Primary: [facility] Medicaid (Medicaid Managed)  Secondary:  [facility] @ [place] [address]  [facility] [address]  Phone: [phone]  Fax: [phone]    Plan of Care/Certification Expiration Date:  Certification Period Expiration Date: 24      Frequency/Duration:   Plan Frequency: 1-2 times/week      Time In/Out:   Time In: 1147  Time Out: 1225    OT Visit Info:  Plan Frequency: 1-2 times/week  Progress Note Due Date: 24  Total # of Visits Approved: 0 (see electronic chart)  Total # of Visits to Date: 7  Progress Note Counter: 1  Canceled Appointment: 2       Visit Count: Visit count could not be calculated. Make sure you are using a visit which is associated with an episode.   OUTPATIENT OCCUPATIONAL THERAPY: Treatment Note 2024  Episode: (OT: CVA)         Treatment Diagnosis:    No data found  Muscle weakness (generalized)  Unsteadiness on feet  Medical/Referring Diagnosis:   Cerebrovascular disease, unspecified Weakness [R53.1]  Referring Physician:  Jennifer Garvin FNP MD Orders:  OT Eval and Treat   Return MD Appt:     Date of Onset:  Onset Date:  (2024)  Allergies:  Atorvastatin  Restrictions/Precautions:   None      Medications Last Reviewed:  2024     Interventions Planned (Treatment may consist of any combination of the following):  Current Treatment Recommendations: Strengthening; ROM; Neuromuscular re-education; Pain management; Safety education & training; Equipment evaluation, education, & procurement; Modalities; Positioning; Dry needling; Self-Care / ADL; Manual Therapy:  STM; Manual Therapy:  Jt Manip; Coordination training; Other (comment) (orthotic management and training)    See Assessment Note      Subjective Comments:   Patient states he hasn't been walking as much lately.  >Initial Pain Level:     0/10  >Post Session Pain Level:     0/10  Medications Last Reviewed:

## 2024-06-03 ENCOUNTER — HOSPITAL ENCOUNTER (OUTPATIENT)
Dept: PHYSICAL THERAPY | Age: 64
Setting detail: RECURRING SERIES
End: 2024-06-03
Payer: MEDICAID

## 2024-06-05 ENCOUNTER — HOSPITAL ENCOUNTER (OUTPATIENT)
Dept: PHYSICAL THERAPY | Age: 64
Setting detail: RECURRING SERIES
Discharge: HOME | End: 2024-06-08
Payer: MEDICAID

## 2024-06-05 PROCEDURE — 97110 THERAPEUTIC EXERCISES: CPT

## 2024-06-05 PROCEDURE — 97530 THERAPEUTIC ACTIVITIES: CPT

## 2024-06-05 ASSESSMENT — PAIN SCALES - GENERAL: PAINLEVEL_OUTOF10: 0

## 2024-06-12 ENCOUNTER — HOSPITAL ENCOUNTER (OUTPATIENT)
Dept: PHYSICAL THERAPY | Age: 64
Setting detail: RECURRING SERIES
Discharge: HOME | End: 2024-06-15
Payer: MEDICAID

## 2024-06-12 PROCEDURE — 97530 THERAPEUTIC ACTIVITIES: CPT

## 2024-06-12 PROCEDURE — 97110 THERAPEUTIC EXERCISES: CPT

## 2024-06-12 PROCEDURE — 92507 TX SP LANG VOICE COMM INDIV: CPT

## 2024-06-12 NOTE — PROGRESS NOTES
Henry Jarrett  : 1960  Primary: [facility] Medicaid  Secondary:  [facility] @ [place] [address] [place] [address]  Phone: [phone]  Fax: [phone]    Effective Dates:    2024 to 2024   Frequency/Duration  1-2 time(s) a week for 30-60 days  SLP Visit Info:  No Show: 0  Canceled Appointment: 4 ([facility] in hospital)  Total # of Visits to Date: 7        OUTPATIENT SPEECH PATHOLOGY NOTE:Daily Note 2024  Appt Desk   Episode        Treatment Diagnosis:    Dysarthria and anarthria  Cognitive communication deficit  Dysphagia, oropharyngeal phase  Medical/Referring Diagnosis:    Cerebrovascular disease, unspecified   Weakness [R53.1]   Referring Physician:  Jennifer Garvin FNP MD Orders:  Eval and Treat   Allergies:  Atorvastatin  Medications Last Reviewed:  2024  Subjective Comments:  Patient reports he was able to see new PCP to establish care. Cardiology increased amlodipine increase slightly from 2.5 to 5 mg daily.   Pain:  Patient does not c/o pain.  Interventions Planned: (Treatment may consist of any combination of the following)        See Assessment Note  GOALS: (Goals have been discussed and agreed upon with patient.)  Short-Term Functional Goals: Time Frame: 4 weeks  Dysphagia Goals  Patient will complete instrumental assessment of swallow function.  Patient will perform alternate liquids/solids, small bites and sips, slow rate of PO intake, and bolus hold trial to improve swallow safety with minimal cues 80% of the time.  Patient will safely ingest diet trials during therapeutic feedings with SLP without overt signs or symptoms of respiratory compromise in efforts to advance diet.      Motor Speech Goals  Patient will learn and implement strategies (over-articulation, pacing) to communicate information at the word level with 90% intelligibility.    Patient will implement trained strategies to read aloud functional

## 2024-06-12 NOTE — PROGRESS NOTES
Henry Jarrett  : 1960  Primary: [facility] Medicaid (Medicaid Managed)  Secondary:  [facility] @ [place] [address]  [facility] [address]  Phone: [phone]  Fax: [phone] Plan Frequency: 2x per week      Plan of Care/Certification Expiration Date: 24          Plan of Care/Certification Expiration Date:  Plan of Care/Certification Expiration Date: 24      Frequency/Duration:   Plan Frequency: 2x per week        Time In/Out:   Time In: 1622  Time Out: 1653      PT Visit Info:    Total # of Visits Approved: 99  Progress Note Due Date: 24  Total # of Visits to Date: 13  Progress Note Counter: 1  Canceled Appointment: 7      Visit Count:  13    OUTPATIENT PHYSICAL THERAPY:   Treatment Note 2024       Episode  (PT - weakness)               Treatment Diagnosis:    Muscle weakness (generalized)  Unsteadiness on feet  Medical/Referring Diagnosis:    Cerebrovascular disease, unspecified    Referring Physician:  Jennifer Garvin FNP MD Orders:  PT Eval and Treat   Return MD Appt:  unknown   Date of Onset:  No data recorded   Allergies:   Atorvastatin  Restrictions/Precautions:   none      Interventions Planned (Treatment may consist of any combination of the following):     See Assessment Note    Subjective Comments: So no I don't have any BP medicine. (Instructed pt to bring some with him at all times in case he forgets to take in the am).  Saw my cardiologist and she feels like it is still a little high so I am to take 2 pills.     Initial Pain Level::   0   /10    Post Session Pain Level:    0    /10  Medications Last Reviewed:  2024  Updated Objective Findings:   High BPs.  Session abbreviated  Treatment   THERAPEUTIC ACTIVITY: (18 minutes):    Therapeutic activities with education  improve understanding of POC, need for daily exercise to maintain strength, balance, and coordination.  Required min verbal cues to for understanding.

## 2024-06-14 ENCOUNTER — HOSPITAL ENCOUNTER (OUTPATIENT)
Dept: PHYSICAL THERAPY | Age: 64
Setting detail: RECURRING SERIES
Discharge: HOME | End: 2024-06-17
Payer: MEDICAID

## 2024-06-14 PROCEDURE — 97530 THERAPEUTIC ACTIVITIES: CPT

## 2024-06-14 PROCEDURE — 97110 THERAPEUTIC EXERCISES: CPT

## 2024-06-14 NOTE — PROGRESS NOTES
6/24/2024  9:30 AM Ida Mcghee SLP [facility]   6/26/2024  2:30 PM Karina Adams, PTA [facility]   7/1/2024  1:45 PM Ida Mcghee SLP [facility]   7/1/2024  2:30 PM Gwyn Galloway, OT [facility]   7/1/2024  3:30 PM Enedelia Porter, PT [facility]   7/3/2024  1:45 PM Karina Adams, PTA [facility] D

## 2024-06-17 ENCOUNTER — HOSPITAL ENCOUNTER (OUTPATIENT)
Dept: PHYSICAL THERAPY | Age: 64
Setting detail: RECURRING SERIES
Discharge: HOME | End: 2024-06-20
Payer: MEDICAID

## 2024-06-17 PROCEDURE — 97110 THERAPEUTIC EXERCISES: CPT

## 2024-06-17 PROCEDURE — 97530 THERAPEUTIC ACTIVITIES: CPT

## 2024-06-17 NOTE — PROGRESS NOTES
Henry Jarrett  : 1960  Primary: [facility] Medicaid (Medicaid Managed)  Secondary:  [facility] @ [place] [address]  [facility] [address]  Phone: [phone]  Fax: [phone] Plan Frequency: 2x per week      Plan of Care/Certification Expiration Date: 24          Plan of Care/Certification Expiration Date:  Plan of Care/Certification Expiration Date: 24      Frequency/Duration:   Plan Frequency: 2x per week        Time In/Out:   Time In: 1346  Time Out: 1432      PT Visit Info:    Total # of Visits Approved: 99  Progress Note Due Date: 24  Total # of Visits to Date: 15  Progress Note Counter: 3  Canceled Appointment: 7      Visit Count:  15    OUTPATIENT PHYSICAL THERAPY:   Treatment Note 2024       Episode  (PT - weakness)               Treatment Diagnosis:    Muscle weakness (generalized)  Unsteadiness on feet  Medical/Referring Diagnosis:    Cerebrovascular disease, unspecified    Referring Physician:  Jennifer Garvin FNP MD Orders:  PT Eval and Treat   Return MD Appt:  unknown   Date of Onset:  No data recorded   Allergies:   Atorvastatin  Restrictions/Precautions:   none      Interventions Planned (Treatment may consist of any combination of the following):     See Assessment Note    Subjective Comments: \"So I started my cholesterol shot this week\".      Initial Pain Level::   0   /10    Post Session Pain Level:    0    /10  Medications Last Reviewed:  2024  Updated Objective Findings:   none today  Treatment   THERAPEUTIC ACTIVITY: ( 15 minutes):    Therapeutic activities with education  improve understanding of POC, need for daily exercise to maintain strength, balance, and coordination.  Required min verbal cues to for understanding.  Pt education.  RG, Dynamic gait,  progress, BP   THERAPEUTIC EXERCISE: (29 minutes):    Exercises per grid below to improve mobility, strength, balance, coordination, and pain relief .

## 2024-06-19 ENCOUNTER — HOSPITAL ENCOUNTER (OUTPATIENT)
Dept: PHYSICAL THERAPY | Age: 64
Setting detail: RECURRING SERIES
Discharge: HOME | End: 2024-06-22
Payer: MEDICAID

## 2024-06-19 PROCEDURE — 97110 THERAPEUTIC EXERCISES: CPT

## 2024-06-19 PROCEDURE — 97129 THER IVNTJ 1ST 15 MIN: CPT

## 2024-06-19 PROCEDURE — 97130 THER IVNTJ EA ADDL 15 MIN: CPT

## 2024-06-19 ASSESSMENT — PAIN SCALES - GENERAL: PAINLEVEL_OUTOF10: 0

## 2024-06-19 NOTE — PROGRESS NOTES
Henry Jarrett  : 1960  Primary: [facility] Medicaid  Secondary:  [facility] @ [place] [address] [place] [address]  Phone: [phone]  Fax: [phone]    Effective Dates:    2024 to 2024   Frequency/Duration  1-2 time(s) a week for 30-60 days  SLP Visit Info:  No Show: 0  Canceled Appointment: 4 ([facility] in hospital)  Total # of Visits to Date: 8        OUTPATIENT SPEECH PATHOLOGY NOTE:Daily Note 2024  Appt Desk   Episode        Treatment Diagnosis:    Dysarthria and anarthria  Cognitive communication deficit  Dysphagia, oropharyngeal phase  Medical/Referring Diagnosis:    Cerebrovascular disease, unspecified   Weakness [R53.1]   Referring Physician:  Jennifer Garvin FNP MD Orders:  Eval and Treat   Allergies:  Atorvastatin  Medications Last Reviewed:  2024  Subjective Comments:  Patient had his new cholesterol shot. States his BP has been better since new medication changes. Has congested cough the last week.   Pain:  Patient does not c/o pain.  Interventions Planned: (Treatment may consist of any combination of the following)        See Assessment Note  GOALS: (Goals have been discussed and agreed upon with patient.)  Short-Term Functional Goals: Time Frame: 4 weeks  Dysphagia Goals  Patient will complete instrumental assessment of swallow function.  Patient will perform alternate liquids/solids, small bites and sips, slow rate of PO intake, and bolus hold trial to improve swallow safety with minimal cues 80% of the time.  Patient will safely ingest diet trials during therapeutic feedings with SLP without overt signs or symptoms of respiratory compromise in efforts to advance diet.      Motor Speech Goals  Patient will learn and implement strategies (over-articulation, pacing) to communicate information at the word level with 90% intelligibility.    Patient will implement trained strategies to read aloud functional

## 2024-06-19 NOTE — PROGRESS NOTES
reps     In sidelying L UE A: against 2 lb dumbbell 1-2 sets 5-10 reps. B: against 1 lb dumbbell 1-2 sets 15-20 reps  1-2 sets 15-20 reps shoulders adducted against red theratubing resistance   Shoulder horizontal abduction adduction Leaning against swiss ball 1-2 sets 15-20 reps  In standing against red theratubing resistance 1-2 sets 10-15 reps     Wrist extension flexion Using PVC pipe with 1 lb string 1-2 sets 15-20 reps Using PVC pipe with 4 lb string 1-2 sets 15-20 reps Using PVC pipe with 2 lb string 3-4 sets 15-20 reps Using PVC pipe with 3 lb weight on string 3-4 sets 15-20 reps   Quadruped roll with B UE support   X 4-5 reps X 3-4 reps with swiss ball X 4-5 reps with swiss ball   3 point pinch Black and blue x 5 reps with each hand Black, green and blue x 5-7 reps with each hand Black and blue x 5-7 reps with each hand Black, green and blue x 5-7 reps with each hand   Digi-  Overhead L 5.0 lb/R 7.0 lb 1-2 sets 20-25 reps Overhead L/R 5.0 lb/R 7.0 lb 1-2 sets 20-25 reps Overhead L/R 5.0 lb/R 7.0 lb 1-2 sets 20-25 reps       Treatment/Session Summary:    Treatment Assessment:   Henry Jarrett is displaying greater resistance levels with B UE strengthening as needed for ADL/instrumental ADL.  BP okay despite lightheadedness. Continue OT per plan of care.   Communication/Consultation:  None today  Equipment provided today:  None  Recommendations/Intent for next treatment session: Next visit will focus on advancement to more challenging activities.    >Total Treatment Billable Duration: 40   minutes   OT Individual Minutes  Time In: 1300  Time Out: 1340  Minutes: 40    Gwyn Galloway OT     Charge Capture  [employer] Portal  Appt Desk    Future Appointments   Date Time Provider Department Center   6/19/2024  2:00 PM Ida Mcghee, SLP [facility]   6/20/2024  3:00 PM Enedelia Porter, PT [facility]   6/24/2024  8:00 AM Karina Adams PTA [facility] D   6/24/2024  8:45 AM SFDIOR OCCUPATIONAL THERAPIST

## 2024-06-20 ENCOUNTER — APPOINTMENT (OUTPATIENT)
Dept: PHYSICAL THERAPY | Age: 64
End: 2024-06-20
Payer: MEDICAID

## 2024-06-24 ENCOUNTER — APPOINTMENT (OUTPATIENT)
Dept: PHYSICAL THERAPY | Age: 64
End: 2024-06-24
Payer: MEDICAID

## 2024-06-26 ENCOUNTER — APPOINTMENT (OUTPATIENT)
Dept: PHYSICAL THERAPY | Age: 64
End: 2024-06-26
Payer: MEDICAID

## 2024-07-01 ENCOUNTER — HOSPITAL ENCOUNTER (OUTPATIENT)
Dept: PHYSICAL THERAPY | Age: 64
Setting detail: RECURRING SERIES
Discharge: HOME | End: 2024-07-04
Payer: MEDICAID

## 2024-07-01 PROCEDURE — 97130 THER IVNTJ EA ADDL 15 MIN: CPT

## 2024-07-01 PROCEDURE — 97110 THERAPEUTIC EXERCISES: CPT

## 2024-07-01 PROCEDURE — 97129 THER IVNTJ 1ST 15 MIN: CPT

## 2024-07-01 ASSESSMENT — PAIN SCALES - GENERAL: PAINLEVEL_OUTOF10: 0

## 2024-07-01 NOTE — PROGRESS NOTES
Henry Jarrett  : 1960  Primary: [facility] Medicaid (Medicaid Managed)  Secondary:  [facility] @ [place] [address]  [facility] [address]  Phone: [phone]  Fax: [phone]    Plan of Care/Certification Expiration Date:  Certification Period Expiration Date: 24      Frequency/Duration:   Plan Frequency: 1-2 times/week      Time In/Out:   Time In: 1430  Time Out: 1510    OT Visit Info:  Plan Frequency: 1-2 times/week  Progress Note Due Date: 24  Total # of Visits Approved: 0 (see electronic chart)  Total # of Visits to Date: 8  Progress Note Counter: 2  Canceled Appointment: 2       Visit Count: Visit count could not be calculated. Make sure you are using a visit which is associated with an episode.   OUTPATIENT OCCUPATIONAL THERAPY: Treatment Note 2024  Episode: (OT: CVA)         Treatment Diagnosis:    No data found  Muscle weakness (generalized)  Unsteadiness on feet  Medical/Referring Diagnosis:   Cerebrovascular disease, unspecified Weakness [R53.1]  Referring Physician:  Jennifer Garvin FNP MD Orders:  OT Eval and Treat   Return MD Appt:     Date of Onset:  Onset Date:  (2024)  Allergies:  Atorvastatin  Restrictions/Precautions:   None      Medications Last Reviewed:  2024     Interventions Planned (Treatment may consist of any combination of the following):  Current Treatment Recommendations: Strengthening; ROM; Neuromuscular re-education; Pain management; Safety education & training; Equipment evaluation, education, & procurement; Modalities; Positioning; Dry needling; Self-Care / ADL; Manual Therapy:  STM; Manual Therapy:  Jt Manip; Coordination training; Other (comment) (orthotic management and training)    See Assessment Note      Subjective Comments:   Patient states he feels like he is 50% better.   >Initial Pain Level:     0/10  >Post Session Pain Level:     0/10  Medications Last Reviewed:  2024  Updated 
d/c summary:  Henry Jarrett is a 63 y.o. male with PMH of recent CVA, HTN and tobacco use disorder who presented to the ED on 9/20/23 for cc worsening slurred speech and instablity with ambulation. Last known normal around 9am prior to symptoms. Hx of of left pontine CVA about six weeks ago at [facility] and was treated on DAPT but could not tolerate statins or zetia.      Most recent MRI shows a new 6mm R alpesh infarct per chart.   As per initial evaluation on 8/16/23:  Patient sustained a L pontine infarct on the 2nd of August. This was his 2nd stroke (History of prior left thalamic infarct (September 2021)- 3 weeks to recover his last one - about a year ago. Woke up that morning to go to work and thought he was fine, noticed he was staggering, going to get dressed, speech was slurred.  Affected R side but L hand to forearm hurts and can hold something in L hand (tries to) and he drops it. L shoulder and hand pain at the base of his thumb. He states he is taking blood pressure medicine, blood thinners and cholesterol medicine. 81 mg baby aspirin.   Patient denies any visual issues.  States he has to think about moving now.  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 Jarrett  has no past medical history on file.  Mr. Jarrett  has no past surgical history on file.  Social History/Living Environment:   Lives With: Family  Type of Home: House  Home Layout: One level  Home Access: Level entry  Bathroom Shower/Tub: Tub/Shower unit  Bathroom Equipment: Shower chair  Home Equipment: Walker, rolling     Prior Level of Function/Work/Activity:   Prior

## 2024-07-01 NOTE — PROGRESS NOTES
activities with education  improve understanding of POC, need for daily exercise to maintain strength, balance, and coordination.  Required min verbal cues to for understanding.  Pt education.  RG, Dynamic gait,  progress, BP   THERAPEUTIC EXERCISE: (29 minutes):    Exercises per grid below to improve mobility, strength, balance, coordination, and pain relief .  Required minimal visual and verbal cues to promote proper body alignment, promote proper body posture, promote proper body mechanics, and promote proper body breathing techniques.  Progressed resistance, range, and repetitions as indicated.     Date:  5-10-24 Date:  5-29-24 Date:  5-31-24 Date:  6/12/2024 Date:  6-14-24 Date:  6-17-24 Date:  7/1/2024   Activity/Exercise          Pt education   20+ minutes re: BP checks, consistency with medication, risk of uncontrolled BP. (See assessment)    Reviewed cancellation policy, BP checks, encouragement to make appt if fatigue is the only symptom   BP log from home with wrist cuff    139/97  136/81  132/89      BP    Sitting 146/102 HR: 77  2nd time right away (pt states it is always high the first take and is lower the 2nd)  157/101 HR;  71   After Nustep    135/84  HR 83  2nd time - immediately      133/88  HR 80       Mid way  121/90  HR  99 127/89  HR:76   Diaphragmatic breathing    Easy x 10 minutes  Then 3 deep inspriations and exhalations (15 and 19 seconds)  139/102  HR 74      Nu step/SCIFIT for reciprocal proprioceptive input and initiation/continuation of exercise for strengthening and increased cardiac rehab in otherwise sedentary person.   SCIFIT   Level 4  10 minutes  904 steps   SCIFIT  Level 2  10 minutes   977 SCIFIT  Level 4  10 minutes  924 steps    SCIFIT  Level 4  10 minutes   932 steps SCIFIT   Level 2  10 minutes   931 steps NuStep  Level 2  10 minutes  .44 miles   Post tilt bridge       X20 slowly   Figure 4/single leg bridge       X10 each foot up   SLR       X20 each  Cues to keep a

## 2024-07-01 NOTE — PROGRESS NOTES
pacing).  Patient will learn and implement strategies (over-articulation, pacing) to communicate information at the sentence level with 80% intelligibility.   Patient will participate in 3 minute conversation with no more than 5 cues for use of strategies (over-articulation, pacing) to communicate effectively.   Patient will report at least one situation in which trained strategies were utilized outside of therapy to improve communication each therapy session.      Cognitive-communication Goals  Patient will use external memory aid with 80% accuracy with verbal and tactile cues to maximize memory skills.   Patient will demonstrate recall of functional information (ex: with use of planner/memory journal/to do list) with min cues with 80% accuracy, in order to increase functional integration into environment.  Discharge Goals: Time Frame: 8 weeks  Patient will develop functional and intelligible speech and utilize compensatory strategies through the use of increased articulatory precision and speech prosody for effective communication.   Patient will utilize a memory management system within the home environment with min amount of support for improved day-to-day continuity and increased self-reliance.   Patient will maintain adequate hydration/nutrition with optimum safety and efficiency of swallowing function with PO intake without overt signs or symptoms of aspiration for the highest appropriate diet level.    Updated Objective Findings:  None Today  Treatment   Dysphagia  Effortful swallow: --  Oly: --  Mendelsohn maneuver: --  Tongue back and hold --  States he had a cough of saliva x1 while eating hard candy the other night. Pt has not yet received orders for MBS as previously requested x3    Motor speech   Utilized strategies (over-articulation, pacing) at conversational level with 93% accuracy min cues    Cognitive-Linguistic  Verbal and written education given for WRAP strategy as external memory aid

## 2024-07-03 ENCOUNTER — HOSPITAL ENCOUNTER (OUTPATIENT)
Dept: PHYSICAL THERAPY | Age: 64
Setting detail: RECURRING SERIES
Discharge: HOME | End: 2024-07-06
Payer: MEDICAID

## 2024-07-03 PROCEDURE — 97110 THERAPEUTIC EXERCISES: CPT

## 2024-07-03 NOTE — PROGRESS NOTES
Henry Jarrett  : 1960  Primary: [facility] Medicaid (Medicaid Managed)  Secondary:  [facility] @ [place] [address]   [facility] [address]  Phone: [phone]  Fax: [phone] Plan Frequency: 2x per week      Plan of Care/Certification Expiration Date: 24          Plan of Care/Certification Expiration Date:  Plan of Care/Certification Expiration Date: 24      Frequency/Duration:   Plan Frequency: 2x per week        Time In/Out:   Time In: 1350 (Patient 5 minutes late)  Time Out: 1431      PT Visit Info:    Total # of Visits Approved: 99  Progress Note Due Date: 24  Total # of Visits to Date: 17  Progress Note Counter: 5  Canceled Appointment: 7      Visit Count:  17    OUTPATIENT PHYSICAL THERAPY:   Treatment Note 7/3/2024       Episode  (PT - weakness)               Treatment Diagnosis:    Muscle weakness (generalized)  Unsteadiness on feet  Medical/Referring Diagnosis:    Cerebrovascular disease, unspecified    Referring Physician:  Jennifer Garvin FNP MD Orders:  PT Eval and Treat   Return MD Appt:  unknown   Date of Onset:  No data recorded   Allergies:   Atorvastatin  Restrictions/Precautions:   none      Interventions Planned (Treatment may consist of any combination of the following):     See Assessment Note    Subjective Comments: Patient reports he does not check his BP everyday.  When asked if he does his HEP or anything else each day, he reports he does some kind of something everyday.  When asked to elaborate he did not, he changed subject.    Attendance Report    Initial Pain Level::   0   /10    Post Session Pain Level:    0    /10  Medications Last Reviewed:  7/3/2024  Updated Objective Findings:   none today  Treatment   THERAPEUTIC ACTIVITY: ( 0 minutes):    Therapeutic activities with education  improve understanding of POC, need for daily exercise to maintain strength, balance, and coordination.  Required min verbal cues

## 2024-07-10 ENCOUNTER — HOSPITAL ENCOUNTER (OUTPATIENT)
Dept: PHYSICAL THERAPY | Age: 64
Setting detail: RECURRING SERIES
End: 2024-07-10
Payer: MEDICAID

## 2024-07-10 ENCOUNTER — APPOINTMENT (OUTPATIENT)
Dept: PHYSICAL THERAPY | Age: 64
End: 2024-07-10
Payer: MEDICAID

## 2024-07-12 ENCOUNTER — APPOINTMENT (OUTPATIENT)
Dept: PHYSICAL THERAPY | Age: 64
End: 2024-07-12
Payer: MEDICAID

## 2024-07-15 ENCOUNTER — HOSPITAL ENCOUNTER (OUTPATIENT)
Dept: PHYSICAL THERAPY | Age: 64
Setting detail: RECURRING SERIES
Discharge: HOME | End: 2024-07-18
Payer: MEDICAID

## 2024-07-15 DIAGNOSIS — R13.12 DYSPHAGIA, OROPHARYNGEAL PHASE: Primary | ICD-10-CM

## 2024-07-15 PROCEDURE — 92526 ORAL FUNCTION THERAPY: CPT

## 2024-07-15 PROCEDURE — 97110 THERAPEUTIC EXERCISES: CPT

## 2024-07-15 NOTE — THERAPY RECERTIFICATION
Henry Jarrett  : 1960  Primary: [facility] Medicaid  Secondary:  [facility] @ [place] [address]  [facility] [address]  Phone: [phone]  Fax: [phone]    Visit Info:    Effective Dates  7/15/2024 to 9/15/2024  Frequency/Duration    1 time(s) a week for 60 days   SPEECH LANGUAGE PATHOLOGY: COMMUNICATION    Recertification 7/15/2024     Appt Desk   Episode        Treatment Diagnosis:    Dysarthria and anarthria  Cognitive communication deficit  Medical/Referring Diagnosis:  Weakness [R53.1]  Referring Physician:  Jennifer Garvin FNP MD Orders:  Eval and Treat   Return MD Appt:  Unknown  Date of Onset:  23  Allergies:  Atorvastatin  Medications Last Reviewed:  7/15/2024     SUBJECTIVE    History of Injury/Illness (Reason for Referral):  Patient known to this clinic, requesting referral back for therapy with c/o struggling with his right side. Patient with continued c/o speech changes and noticing some memory difficulties, though he feels that have improved some in the last couple months. Reports forgetting what he walked into a room for, forgetting what he went into the store to buy, and difficulty remembering what he was doing, if wife talks/requests something of him while he is engaged in another task. Pt states he is highly motivated to continue to make improvements. 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     Previous hx: Pt with acute CVA 23 MRI shows new 6 mm R alpesh infarct. Prior to this, Pt with CVA affecting R side 2023. L pontine stroke. States on 23 he was staggering, change in vision, and had slurred speech. He reports a previous stroke about a year ago and he was able to recover from the first in 3-4 weeks.     24: Pt presented to ED  and  with new CVA. Per chart, MRI of the brain showed acute infarct right ventral pontine region.  Remote

## 2024-07-15 NOTE — PROGRESS NOTES
Henry Jarrett  : 1960  Primary: [facility] Medicaid (Medicaid Managed)  Secondary:  [facility] @ [place] [address]  [facility] [address]  Phone: [phone]  Fax: [phone] Plan Frequency: 2x per week      Plan of Care/Certification Expiration Date: 24          Plan of Care/Certification Expiration Date:  Plan of Care/Certification Expiration Date: 24      Frequency/Duration:   Plan Frequency: 2x per week        Time In/Out:   Time In: 1430  Time Out: 1511      PT Visit Info:    Total # of Visits Approved: 99  Progress Note Due Date: 24  Total # of Visits to Date: 18  Progress Note Counter: 6  Canceled Appointment: 7      Visit Count:  18    OUTPATIENT PHYSICAL THERAPY:   Treatment Note 7/15/2024       Episode  (PT - weakness)               Treatment Diagnosis:    Muscle weakness (generalized)  Unsteadiness on feet  Medical/Referring Diagnosis:    Cerebrovascular disease, unspecified    Referring Physician:  Jennifer Garvin FNP MD Orders:  PT Eval and Treat   Return MD Appt:  unknown   Date of Onset:  No data recorded   Allergies:   Atorvastatin  Restrictions/Precautions:   none      Interventions Planned (Treatment may consist of any combination of the following):     See Assessment Note    Subjective Comments: Patient reports he has been more tired/fatigued.  He reports low energy. States he is trying to do his exercises every day.     Attendance Report    Initial Pain Level::   0   /10    Post Session Pain Level:    0    /10  Medications Last Reviewed:  7/15/2024  Updated Objective Findings:   none today  Treatment   THERAPEUTIC ACTIVITY: ( 0 minutes):    Therapeutic activities with education  improve understanding of POC, need for daily exercise to maintain strength, balance, and coordination.  Required min verbal cues to for understanding.  Pt education.  RG, Dynamic gait,  progress, BP   THERAPEUTIC EXERCISE: (40 minutes):

## 2024-07-15 NOTE — PROGRESS NOTES
Henry Jarrett  : 1960  Primary: [facility] Medicaid  Secondary:  [facility] @ [place] [address]  [place] [address]  Phone: [phone]  Fax: [phone]    Effective Dates:    2024 to 2024   Frequency/Duration  1-2 time(s) a week for 30-60 days  SLP Visit Info:  No Show: 0  Canceled Appointment: 1 (7/10 car doesn't have a/c)  Total # of Visits to Date: 10 (7/15/24)        OUTPATIENT SPEECH PATHOLOGY NOTE:Daily Note 7/15/2024  Appt Desk   Episode        Treatment Diagnosis:    Dysarthria and anarthria  Cognitive communication deficit  Dysphagia, oropharyngeal phase  Medical/Referring Diagnosis:    Cerebrovascular disease, unspecified   Weakness [R53.1]   Referring Physician:  Jennifer Garvin FNP MD Orders:  Eval and Treat   Allergies:  Atorvastatin  Medications Last Reviewed:  7/15/2024  Subjective Comments:  Pt reports no pertinent updates.   Pain:  Patient does not c/o pain.  Interventions Planned: (Treatment may consist of any combination of the following)        See Assessment Note  RECOMMENDATIONS   Diet:  Regular Consistency  Thin Liquids    Medications: as tolerated   Compensatory Swallowing Strategies:  Slow rate of intake  Effortful swallow  Remain upright for 30-45 minutes after meals  Small bites/sips  Upright as possible for all oral intake   Therapeutic Intervention:  Patient/family education  Treatment to improve/facilitate oral/pharyngeal skills  Training in use of compensatory safe swallowing strategies/feeding guidelines   Patient continues to require skilled intervention: Yes. Speech therapy services are clinically indicated at this time to address swallow function.  Modified Barium Swallow Study     GOALS: (Goals have been discussed and agreed upon with patient.)  Short-Term Functional Goals: Time Frame: 4 weeks  Dysphagia Goals  Patient will complete instrumental assessment of swallow function.  Patient will perform

## 2024-07-16 NOTE — PROGRESS NOTES
Outpatient Rehab Services  Referral Form/Physician Order  Central Scheduling Fax: [phone]  Speak to a :  Call [phone]   Please review and co-sign (electronically) OR sign and return to the below indicated clinic's fax number if you agree with this request.  Thank you!      NAME:  Henry Jarrett SSN:  [id number] :  1960   ADDRESS:  [address] [place] [address] Daytime Phone:  [phone] (home)There is no such number on file (mobile).    Diagnosis & Date of Onset:  Weakness [R53.1]  Dysphagia, oropharyngeal phase R13.12 Special Precautions:  Please place orders for Modified Barium Swallow STudy     Frequency:  [] to be determined by therapist after evaluation   OR   ____ X per week X ____ weeks    Services    [] Evaluate & Treat  [] Special Orders________________________   [] Physical Therapy  [] Occupational Therapy   [] Speech Therapy  [x] MBS w/ Speech Therapy    Specialized Programs    [] Aquatic Therapy [] Lymphedema [] Oncology Rehab   [] Balance Rehab [] Parkinson's Program [] Osteoporosis   [] Fibromyalgia [] Motion Analysis [] Spine Rehab   [] Industrial Rehab [] Hand & Upper Extremity [] Sports Injury Rehab    [] Urinary Incontinence     Locations    @ [facility]  [address], [address]  [place] [address]  Ph: [phone]  Fax: [phone] @ [address]  [place] [address]  Ph: [phone]  Fax: [phone] @ [address], [address]  [place] [address]  Ph: [phone]  Fax: [phone]        @ [address] [address]  Ph: [phone]  Fax: [phone] @ [facility]  [address]  [place] [address]   Ph: [phone]  Fax: [phone] @ [place] [address] [place] [address]  Ph: [phone]  Fax: [phone]        @ [facility]  [address] [place] [address]  Ph: [phone]  Fax:

## 2024-07-17 ENCOUNTER — APPOINTMENT (OUTPATIENT)
Dept: PHYSICAL THERAPY | Age: 64
End: 2024-07-17
Payer: MEDICAID

## 2024-07-22 ENCOUNTER — HOSPITAL ENCOUNTER (OUTPATIENT)
Dept: PHYSICAL THERAPY | Age: 64
Setting detail: RECURRING SERIES
End: 2024-07-22
Payer: MEDICAID

## 2024-07-22 ENCOUNTER — APPOINTMENT (OUTPATIENT)
Dept: PHYSICAL THERAPY | Age: 64
End: 2024-07-22
Payer: MEDICAID

## 2024-07-24 ENCOUNTER — HOSPITAL ENCOUNTER (OUTPATIENT)
Dept: PHYSICAL THERAPY | Age: 64
Setting detail: RECURRING SERIES
End: 2024-07-24
Payer: MEDICAID

## 2024-07-24 NOTE — PROGRESS NOTES
Henry Jarrett  : 1960  Primary: [facility] Medicaid  Secondary:  [facility] @ [place] [address]   [facility] [address]  Phone: [phone]  Fax: [phone] Plan Frequency: 2x per week    Plan of Care/Certification Expiration Date: 24      PT Visit Info:  Total # of Visits Approved: 99  Total # of Visits to Date: 18  Progress Note Counter: 6  Progress Note Due Date: 24  No Show: 5  Canceled Appointment: 8 (plus)         OUTPATIENT PHYSICAL THERAPY 2024     Appt Desk   Episode   Glennylorriradha      Was calling to cancel Mr jarrett's appointment for today due to POC out of date since he missed his last appointment.  He stated he was going to have to cancel anyway due to needing to get his daughter a car for school.   Did talk in detail with patient and he agreed to be discharged at this time to get his BP under control and get daughter back to school.  Will inform his lead PT.      Karina Adams PTA    Future Appointments   Date Time Provider Department Center   2024  1:00 PM Karina Adams PTA [facility]   2024  1:00 PM Enedelia Porter, PT [facility]   2024  1:45 PM Ida Mcghee, SLP [facility]   2024  2:30 PM Karina Adams PTA [facility]   2024  3:30 PM [facility] OCCUPATIONAL THERAPIST [facility]   2024  9:30 AM [facility] XR RF ROOM 3 [facility]

## 2024-07-29 ENCOUNTER — APPOINTMENT (OUTPATIENT)
Dept: PHYSICAL THERAPY | Age: 64
End: 2024-07-29
Payer: MEDICAID

## 2024-07-31 ENCOUNTER — APPOINTMENT (OUTPATIENT)
Dept: PHYSICAL THERAPY | Age: 64
End: 2024-07-31
Payer: MEDICAID

## 2024-08-08 ENCOUNTER — HOSPITAL ENCOUNTER (OUTPATIENT)
Dept: GENERAL RADIOLOGY | Age: 64
Discharge: HOME | End: 2024-08-08

## 2024-08-08 DIAGNOSIS — R13.10 DYSPHAGIA, UNSPECIFIED TYPE: ICD-10-CM

## 2024-08-08 PROCEDURE — 74230 X-RAY XM SWLNG FUNCJ C+: CPT

## 2024-08-08 PROCEDURE — 92611 MOTION FLUOROSCOPY/SWALLOW: CPT

## 2024-08-08 PROCEDURE — 2500000003 HC RX 250 WO HCPCS: Performed by: NURSE PRACTITIONER

## 2024-08-08 RX ADMIN — BARIUM SULFATE 30 ML: 0.81 POWDER, FOR SUSPENSION ORAL at 09:49

## 2024-08-08 RX ADMIN — BARIUM SULFATE 30 ML: 400 PASTE ORAL at 09:49

## 2024-08-08 NOTE — THERAPY EVALUATION
Henry Jarrett  : 1960  Primary:   Secondary:   MRN: [id number] [facility]  [address] [place] [address]  Phone: [phone]    Visit Info:    Date 2024   SPEECH LANGUAGE PATHOLOGY:   MODIFIED BARIUM SWALLOW STUDY     Appt Desk   Episode      Treatment Diagnosis:     Dysphagia, unspecified type    Medical/Referring Diagnosis:  Dysphagia, unspecified type [R13.10]  Referring Physician:  Remington Haynes, AP*  Radiologist: Dr. Barragan  Fluoroscopy completed by: Dr. Laurel ESCALERA Orders: Modified Barium Swallow  Date of Onset:  No data recorded   Allergies:  Atorvastatin    PAST MEDICAL HISTORY:   Mr. Jarrett is a 64 y.o. male who  has no past medical history on file.  He also  has no past surgical history on file.    ASSESSMENT/PLAN OF CARE   Based on the objective data described below, Mr. Jarrett presents with mild oropharyngeal dysphagia as evidenced by occasional trace, transient laryngeal penetration without aspiration of larger bolus sizes of thin liquids by cup and straw. Otherwise, no laryngeal penetration or aspiration observed and no pharyngeal residue after swallow with any consistency assessed.    RECOMMENDATIONS AND PLANNED INTERVENTIONS:  DIET:   regular  thin liquids    MEDICATIONS: as tolerated    Compensatory Swallowing Strategies/Modifications:  Upright for all PO  Small bites and sips  Slow rate of PO intake  Remain upright for 20-30 min after any PO    Additional Recommendations/Referrals:  Treatment to improve/facilitate oral/pharyngeal skills   Training in laryngeal strengthening and coordination exercises  Training in use of compensatory safe swallowing strategies/feeding guidelines      GENERAL    Patient pleasant and cooperative. He is accompanied by his wife.  Present dysphagia symptoms: He currently complains of choking/strangling occasionally with PO but also sometimes on his saliva .  Current dietary status prior to evaluation today: regular